# Patient Record
Sex: FEMALE | Race: WHITE | NOT HISPANIC OR LATINO | Employment: UNEMPLOYED | URBAN - METROPOLITAN AREA
[De-identification: names, ages, dates, MRNs, and addresses within clinical notes are randomized per-mention and may not be internally consistent; named-entity substitution may affect disease eponyms.]

---

## 2018-08-31 ENCOUNTER — OFFICE VISIT CONVERTED (OUTPATIENT)
Dept: NEUROSURGERY | Facility: CLINIC | Age: 53
End: 2018-08-31
Attending: PHYSICIAN ASSISTANT

## 2018-08-31 ENCOUNTER — CONVERSION ENCOUNTER (OUTPATIENT)
Dept: NEUROLOGY | Facility: CLINIC | Age: 53
End: 2018-08-31

## 2019-01-15 ENCOUNTER — HOSPITAL ENCOUNTER (OUTPATIENT)
Dept: URGENT CARE | Facility: CLINIC | Age: 54
Discharge: HOME | End: 2019-01-15
Attending: FAMILY MEDICINE

## 2019-01-17 LAB — BACTERIA SPEC AEROBE CULT: NORMAL

## 2019-04-06 ENCOUNTER — HOSPITAL ENCOUNTER (OUTPATIENT)
Dept: URGENT CARE | Facility: CLINIC | Age: 54
Discharge: HOME | End: 2019-04-06

## 2019-04-18 ENCOUNTER — OFFICE VISIT CONVERTED (OUTPATIENT)
Dept: OTOLARYNGOLOGY | Facility: CLINIC | Age: 54
End: 2019-04-18
Attending: OTOLARYNGOLOGY

## 2019-06-21 ENCOUNTER — OFFICE VISIT CONVERTED (OUTPATIENT)
Dept: NEUROSURGERY | Facility: CLINIC | Age: 54
End: 2019-06-21
Attending: PHYSICIAN ASSISTANT

## 2019-07-01 ENCOUNTER — HOSPITAL ENCOUNTER (OUTPATIENT)
Dept: GENERAL RADIOLOGY | Facility: HOSPITAL | Age: 54
Discharge: HOME | End: 2019-07-01
Attending: PHYSICIAN ASSISTANT

## 2019-08-08 ENCOUNTER — OFFICE VISIT CONVERTED (OUTPATIENT)
Dept: NEUROSURGERY | Facility: CLINIC | Age: 54
End: 2019-08-08
Attending: PHYSICIAN ASSISTANT

## 2019-08-08 ENCOUNTER — CONVERSION ENCOUNTER (OUTPATIENT)
Dept: NEUROLOGY | Facility: CLINIC | Age: 54
End: 2019-08-08

## 2019-11-26 ENCOUNTER — OFFICE VISIT CONVERTED (OUTPATIENT)
Dept: SURGERY | Facility: CLINIC | Age: 54
End: 2019-11-26
Attending: SURGERY

## 2020-01-16 ENCOUNTER — HOSPITAL ENCOUNTER (OUTPATIENT)
Dept: SLEEP MEDICINE | Facility: HOSPITAL | Age: 55
Discharge: HOME | End: 2020-01-16
Attending: PSYCHIATRY & NEUROLOGY

## 2020-01-22 ENCOUNTER — HOSPITAL ENCOUNTER (OUTPATIENT)
Dept: GENERAL RADIOLOGY | Facility: HOSPITAL | Age: 55
Discharge: HOME | End: 2020-01-22
Attending: NURSE PRACTITIONER

## 2020-03-17 ENCOUNTER — HOSPITAL ENCOUNTER (OUTPATIENT)
Dept: URGENT CARE | Facility: CLINIC | Age: 55
Discharge: HOME | End: 2020-03-17
Attending: EMERGENCY MEDICINE

## 2020-03-19 LAB — BACTERIA SPEC AEROBE CULT: NORMAL

## 2020-03-26 ENCOUNTER — HOSPITAL ENCOUNTER (OUTPATIENT)
Dept: URGENT CARE | Facility: CLINIC | Age: 55
Discharge: HOME | End: 2020-03-26

## 2020-03-28 LAB — BACTERIA SPEC AEROBE CULT: NORMAL

## 2020-09-28 ENCOUNTER — HOSPITAL ENCOUNTER (OUTPATIENT)
Dept: URGENT CARE | Facility: CLINIC | Age: 55
Discharge: HOME | End: 2020-09-28
Attending: FAMILY MEDICINE

## 2020-10-22 ENCOUNTER — HOSPITAL ENCOUNTER (OUTPATIENT)
Dept: GENERAL RADIOLOGY | Facility: HOSPITAL | Age: 55
Discharge: HOME | End: 2020-10-22
Attending: NURSE PRACTITIONER

## 2020-11-03 ENCOUNTER — OFFICE VISIT CONVERTED (OUTPATIENT)
Dept: ORTHOPEDIC SURGERY | Facility: CLINIC | Age: 55
End: 2020-11-03
Attending: ORTHOPAEDIC SURGERY

## 2020-12-20 ENCOUNTER — HOSPITAL ENCOUNTER (OUTPATIENT)
Dept: URGENT CARE | Facility: CLINIC | Age: 55
Discharge: HOME | End: 2020-12-20
Attending: PHYSICIAN ASSISTANT

## 2020-12-22 LAB — BACTERIA SPEC AEROBE CULT: NORMAL

## 2021-01-17 ENCOUNTER — HOSPITAL ENCOUNTER (OUTPATIENT)
Dept: URGENT CARE | Facility: CLINIC | Age: 56
Discharge: HOME | End: 2021-01-17
Attending: NURSE PRACTITIONER

## 2021-01-19 LAB — BACTERIA SPEC AEROBE CULT: NORMAL

## 2021-01-20 LAB — SARS-COV-2 RNA SPEC QL NAA+PROBE: NOT DETECTED

## 2021-02-03 ENCOUNTER — HOSPITAL ENCOUNTER (OUTPATIENT)
Dept: VACCINE CLINIC | Facility: HOSPITAL | Age: 56
Discharge: HOME | End: 2021-02-03
Attending: INTERNAL MEDICINE

## 2021-03-04 ENCOUNTER — HOSPITAL ENCOUNTER (OUTPATIENT)
Dept: VACCINE CLINIC | Facility: HOSPITAL | Age: 56
Discharge: HOME | End: 2021-03-04
Attending: INTERNAL MEDICINE

## 2021-04-20 ENCOUNTER — HOSPITAL ENCOUNTER (OUTPATIENT)
Dept: GENERAL RADIOLOGY | Facility: HOSPITAL | Age: 56
Discharge: HOME | End: 2021-04-20
Attending: NURSE PRACTITIONER

## 2021-05-05 ENCOUNTER — HOSPITAL ENCOUNTER (OUTPATIENT)
Dept: GENERAL RADIOLOGY | Facility: HOSPITAL | Age: 56
Discharge: HOME | End: 2021-05-05
Attending: NURSE PRACTITIONER

## 2021-05-10 NOTE — H&P
History and Physical      Patient Name: Shawanda Smith   Patient ID: [id number]   Sex: Female   YOB: 1965    Primary Care Provider: Haydee Jolly NP   Referring Provider: Viri GARCIA    Visit Date: November 3, 2020    Provider: Nay Donaldson MD   Location: [facility]   Location Address: [address] [place] [address]  [id number]   Location Phone: [phone]          Chief Complaint  · Right Shoulder Pain      History Of Present Illness  Shawanda Smith is a 54 year old /White female who presents today to [facility].      Patient presents today with a chief complaint of right shoulder pain. Patient states spring of 2019, she went to the back patio to get a doll while wearing flipflops. She slid down and fell down a hill causing her to land on her right shoulder. Since then, one day, patient rolled over in bed and felt a large pop in her shoulder. She has attended PT twice for her shoulder, they discontinued the second round of PT after a month because she showed no improvement. Patient states she is starting to have problems with ROM due to the pain. Due to pain patients PCP got x-rays and an MRI done and referred her to [facility]. She has been icing and putting heat on her shoulder off and on.       Past Medical History  Arm weakness; Arthritis; Cervicalgia; Degenerative Disc Disease ; Difficulty in swallowing; Dysphagia; Headache; Herniated Disc; Migraines; Nausea; Numbness and tingling in both hands; Seasonal allergies; Vertigo         Past Surgical History  Cervical Fusion; Gallbladder; Hysterectomy; Joint Surgery; Lumbar puncture; Metal implants         Medication List  ibuprofen 800 mg oral tablet; lidocaine 5 % topical adhesive patch,medicated; tizanidine 4 mg oral tablet         Allergy List  aspirin; morphine; PENICILLINS       Allergies Reconciled  Family Medical History  Heart Disease; Cancer, Unspecified; Family history of Arthritis;  "Family history of heart disease         Social History  Alcohol (Current some day); Alcohol Use (Current some day); lives with spouse; ; .; Recreational Drug Use (Never); Tobacco (Never); Unemployed.; Working         Review of Systems  · Constitutional  o Denies  o : fever, chills, weight loss  · Cardiovascular  o Denies  o : chest pain, shortness of breath  · Gastrointestinal  o Denies  o : liver disease, heartburn, nausea, blood in stools  · Genitourinary  o Denies  o : painful urination, blood in urine  · Integument  o Denies  o : rash, itching  · Neurologic  o Denies  o : headache, weakness, loss of consciousness  · Musculoskeletal  o Denies  o : painful, swollen joints  · Psychiatric  o Denies  o : drug/alcohol addiction, anxiety, depression      Vitals  Date Time BP Position Site L\R Cuff Size HR RR TEMP (F) WT  HT  BMI kg/m2 BSA m2 O2 Sat FR L/min FiO2 HC       11/03/2020 01:39 PM         193lbs 0oz 5'  3\" 34.19 1.97             Physical Examination  · Constitutional  o Appearance  o : well developed, well-nourished, no obvious deformities present  · Head and Face  o Head  o :   § Inspection  § : normocephalic  o Face  o :   § Inspection  § : no facial lesions  · Eyes  o Conjunctivae  o : conjunctivae normal  o Sclerae  o : sclerae white  · Ears, Nose, Mouth and Throat  o Ears  o :   § External Ears  § : appearance within normal limits  § Hearing  § : intact  o Nose  o :   § External Nose  § : appearance normal  · Neck  o Inspection/Palpation  o : normal appearance  o Range of Motion  o : full range of motion  · Respiratory  o Respiratory Effort  o : breathing unlabored  o Inspection of Chest  o : normal appearance  o Auscultation of Lungs  o : no audible wheezing or rales  · Cardiovascular  o Heart  o : regular rate  · Gastrointestinal  o Abdominal Examination  o : soft and non-tender  · Skin and Subcutaneous Tissue  o General Inspection  o : intact, no rashes  · Psychiatric  o General  o : " Alert and oriented x3  o Judgement and Insight  o : judgment and insight intact  o Mood and Affect  o : mood normal, affect appropriate  · Right Shoulder  o Inspection  o : Sensation grossly intact. Neurovascular intact. Pulses are pleasant. No swelling, skin discoloration or atrophy. Good tone of deltoid, biceps, triceps, wrist extensors, and wrist flexors. Pain with empty can testing. Pain with drop arm testing. Forward flexion to 110. Tender to palpation. Skin intact. Pain with cross body adduction. Mild pain with Neers. Mild pain with Gracia. Popping with ROM.   · Injection Note/Aspiration Note  o Site  o : right shoulder  o Procedure  o : Procedure: After educating the patient, patient gave consent for procedure. After using Chloraprep, the joint space was injected. The patient tolerated the procedure well.   o Medication  o : 80 mg of DepoMedrol with 9cc of 1% Lidocaine  · Imaging  o Imaging  o : [XRAY 9/28/20] 1. Negative radiographs of the shoulder. [MRI 10/22/20] 1. Rotator cuff tendinosis with mild partial articular sided tearing of the supraspinatus, infraspinatus and subscapularis tendons. No evidence of complete tear. 2. Mild acromioclavicular and glenohumeral osteoarthritis. 3. Mild pan labral degenerative tearing with no evidence of a focal displaced tear..           Assessment  · Right shoulder pain, unspecified chronicity     719.41/M25.511  · Shoulder impingement syndrome, right     726.2/M75.41      Plan  · Orders  o Depo-Medrol injection 80mg () - - 11/03/2020   Lot [id number] Exp 09 2021 [employer] Administered by LILIA Donaldson MD  o Shoulder Intra-articular Injection without US Guidance [facility] (08099) - - 11/03/2020   Lot [id number] Exp 08 01 2021 [employer] Administered by LILIA Donaldson MD  · Medications  o Medications have been Reconciled  o Transition of Care or Provider Policy  · Instructions  o Dr. Donaldson saw and examined the patient and agrees with plan.   o X-rays reviewed by   Mendoza.  o Reviewed the patient's Past Medical, Social, and Family history as well as the ROS at today's visit, no changes.  o Call or return if worsening symptoms.  o Follow up in 1 month.  o This note was transcribed by Olive Gomez. minor steen Discussed diagnosis and treatment options with the patient. Patient opted for an injection and tolerated it well. Patient also opted to go back to PT.            Electronically Signed by: Olive Gomez-, Other -Author on November 5, 2020 02:02:00 PM  Electronically Co-signed by: Nay Donaldson MD -Reviewer on November 6, 2020 08:22:34 AM

## 2021-05-14 VITALS — HEIGHT: 63 IN | BODY MASS INDEX: 34.2 KG/M2 | WEIGHT: 193 LBS

## 2021-05-15 VITALS
RESPIRATION RATE: 18 BRPM | HEIGHT: 63 IN | TEMPERATURE: 97.5 F | WEIGHT: 194.12 LBS | HEART RATE: 87 BPM | BODY MASS INDEX: 34.39 KG/M2 | SYSTOLIC BLOOD PRESSURE: 107 MMHG | OXYGEN SATURATION: 98 % | DIASTOLIC BLOOD PRESSURE: 60 MMHG

## 2021-05-15 VITALS
HEIGHT: 63 IN | WEIGHT: 196.06 LBS | BODY MASS INDEX: 34.74 KG/M2 | DIASTOLIC BLOOD PRESSURE: 99 MMHG | SYSTOLIC BLOOD PRESSURE: 150 MMHG

## 2021-05-15 VITALS — BODY MASS INDEX: 34.2 KG/M2 | WEIGHT: 193 LBS | RESPIRATION RATE: 22 BRPM | HEIGHT: 63 IN

## 2021-05-16 VITALS
SYSTOLIC BLOOD PRESSURE: 118 MMHG | BODY MASS INDEX: 34.55 KG/M2 | HEIGHT: 63 IN | HEART RATE: 73 BPM | WEIGHT: 195 LBS | DIASTOLIC BLOOD PRESSURE: 71 MMHG

## 2021-05-22 ENCOUNTER — TRANSCRIBE ORDERS (OUTPATIENT)
Dept: ADMINISTRATIVE | Facility: HOSPITAL | Age: 56
End: 2021-05-22

## 2021-05-22 DIAGNOSIS — D73.89 LESION OF SPLEEN: ICD-10-CM

## 2021-05-22 DIAGNOSIS — Z12.31 ENCOUNTER FOR SCREENING MAMMOGRAM FOR BREAST CANCER: Primary | ICD-10-CM

## 2021-08-07 ENCOUNTER — APPOINTMENT (OUTPATIENT)
Dept: CT IMAGING | Facility: HOSPITAL | Age: 56
End: 2021-08-07

## 2021-08-07 ENCOUNTER — HOSPITAL ENCOUNTER (EMERGENCY)
Facility: HOSPITAL | Age: 56
Discharge: HOME | End: 2021-08-07
Attending: EMERGENCY MEDICINE | Admitting: EMERGENCY MEDICINE

## 2021-08-07 VITALS
RESPIRATION RATE: 16 BRPM | TEMPERATURE: 98 F | SYSTOLIC BLOOD PRESSURE: 100 MMHG | HEART RATE: 55 BPM | BODY MASS INDEX: 37.58 KG/M2 | OXYGEN SATURATION: 100 % | WEIGHT: 212.08 LBS | HEIGHT: 63 IN | DIASTOLIC BLOOD PRESSURE: 68 MMHG

## 2021-08-07 DIAGNOSIS — R51.9 HEADACHE, UNSPECIFIED HEADACHE TYPE: Primary | ICD-10-CM

## 2021-08-07 LAB
ALBUMIN SERPL-MCNC: 3.9 G/DL (ref 3.5–5.2)
ALBUMIN/GLOB SERPL: 1.5 G/DL
ALP SERPL-CCNC: 68 U/L (ref 39–117)
ALT SERPL W P-5'-P-CCNC: 19 U/L (ref 1–33)
ANION GAP SERPL CALCULATED.3IONS-SCNC: 7.1 MMOL/L (ref 5–15)
AST SERPL-CCNC: 18 U/L (ref 1–32)
BASOPHILS # BLD AUTO: 0.05 10*3/MM3 (ref 0–0.2)
BASOPHILS NFR BLD AUTO: 0.7 % (ref 0–1.5)
BILIRUB SERPL-MCNC: 0.3 MG/DL (ref 0–1.2)
BUN SERPL-MCNC: 12 MG/DL (ref 6–20)
BUN/CREAT SERPL: 12.9 (ref 7–25)
CALCIUM SPEC-SCNC: 9.2 MG/DL (ref 8.6–10.5)
CHLORIDE SERPL-SCNC: 102 MMOL/L (ref 98–107)
CO2 SERPL-SCNC: 28.9 MMOL/L (ref 22–29)
CREAT SERPL-MCNC: 0.93 MG/DL (ref 0.57–1)
DEPRECATED RDW RBC AUTO: 43.6 FL (ref 37–54)
EOSINOPHIL # BLD AUTO: 0.27 10*3/MM3 (ref 0–0.4)
EOSINOPHIL NFR BLD AUTO: 3.7 % (ref 0.3–6.2)
ERYTHROCYTE [DISTWIDTH] IN BLOOD BY AUTOMATED COUNT: 13 % (ref 12.3–15.4)
GFR SERPL CREATININE-BSD FRML MDRD: 63 ML/MIN/1.73
GLOBULIN UR ELPH-MCNC: 2.6 GM/DL
GLUCOSE SERPL-MCNC: 94 MG/DL (ref 65–99)
HCT VFR BLD AUTO: 45.7 % (ref 34–46.6)
HGB BLD-MCNC: 14.8 G/DL (ref 12–15.9)
HOLD SPECIMEN: NORMAL
HOLD SPECIMEN: NORMAL
IMM GRANULOCYTES # BLD AUTO: 0.03 10*3/MM3 (ref 0–0.05)
IMM GRANULOCYTES NFR BLD AUTO: 0.4 % (ref 0–0.5)
LYMPHOCYTES # BLD AUTO: 3.39 10*3/MM3 (ref 0.7–3.1)
LYMPHOCYTES NFR BLD AUTO: 47 % (ref 19.6–45.3)
MCH RBC QN AUTO: 29.5 PG (ref 26.6–33)
MCHC RBC AUTO-ENTMCNC: 32.4 G/DL (ref 31.5–35.7)
MCV RBC AUTO: 91.2 FL (ref 79–97)
MONOCYTES # BLD AUTO: 0.47 10*3/MM3 (ref 0.1–0.9)
MONOCYTES NFR BLD AUTO: 6.5 % (ref 5–12)
NEUTROPHILS NFR BLD AUTO: 3.01 10*3/MM3 (ref 1.7–7)
NEUTROPHILS NFR BLD AUTO: 41.7 % (ref 42.7–76)
NRBC BLD AUTO-RTO: 0 /100 WBC (ref 0–0.2)
PLATELET # BLD AUTO: 231 10*3/MM3 (ref 140–450)
PMV BLD AUTO: 11.4 FL (ref 6–12)
POTASSIUM SERPL-SCNC: 4.6 MMOL/L (ref 3.5–5.2)
PROT SERPL-MCNC: 6.5 G/DL (ref 6–8.5)
RBC # BLD AUTO: 5.01 10*6/MM3 (ref 3.77–5.28)
SODIUM SERPL-SCNC: 138 MMOL/L (ref 136–145)
WBC # BLD AUTO: 7.22 10*3/MM3 (ref 3.4–10.8)
WHOLE BLOOD HOLD SPECIMEN: NORMAL

## 2021-08-07 PROCEDURE — 25010000002 METOCLOPRAMIDE PER 10 MG: Performed by: NURSE PRACTITIONER

## 2021-08-07 PROCEDURE — 25010000002 DIPHENHYDRAMINE PER 50 MG: Performed by: NURSE PRACTITIONER

## 2021-08-07 PROCEDURE — 80053 COMPREHEN METABOLIC PANEL: CPT

## 2021-08-07 PROCEDURE — 99284 EMERGENCY DEPT VISIT MOD MDM: CPT

## 2021-08-07 PROCEDURE — 70450 CT HEAD/BRAIN W/O DYE: CPT

## 2021-08-07 PROCEDURE — 96375 TX/PRO/DX INJ NEW DRUG ADDON: CPT

## 2021-08-07 PROCEDURE — 85025 COMPLETE CBC W/AUTO DIFF WBC: CPT

## 2021-08-07 PROCEDURE — 70450 CT HEAD/BRAIN W/O DYE: CPT | Performed by: RADIOLOGY

## 2021-08-07 PROCEDURE — 96374 THER/PROPH/DIAG INJ IV PUSH: CPT

## 2021-08-07 RX ORDER — SODIUM CHLORIDE 0.9 % (FLUSH) 0.9 %
10 SYRINGE (ML) INJECTION AS NEEDED
Status: DISCONTINUED | OUTPATIENT
Start: 2021-08-07 | End: 2021-08-07 | Stop reason: HOSPADM

## 2021-08-07 RX ORDER — METOCLOPRAMIDE HYDROCHLORIDE 5 MG/ML
10 INJECTION INTRAMUSCULAR; INTRAVENOUS ONCE
Status: COMPLETED | OUTPATIENT
Start: 2021-08-07 | End: 2021-08-07

## 2021-08-07 RX ORDER — ACETAMINOPHEN 500 MG
1000 TABLET ORAL ONCE
Status: COMPLETED | OUTPATIENT
Start: 2021-08-07 | End: 2021-08-07

## 2021-08-07 RX ORDER — DIPHENHYDRAMINE HYDROCHLORIDE 50 MG/ML
12.5 INJECTION INTRAMUSCULAR; INTRAVENOUS ONCE
Status: COMPLETED | OUTPATIENT
Start: 2021-08-07 | End: 2021-08-07

## 2021-08-07 RX ADMIN — ACETAMINOPHEN 1000 MG: 500 TABLET ORAL at 15:50

## 2021-08-07 RX ADMIN — METOCLOPRAMIDE HYDROCHLORIDE 10 MG: 5 INJECTION INTRAMUSCULAR; INTRAVENOUS at 15:50

## 2021-08-07 RX ADMIN — SODIUM CHLORIDE 1000 ML: 9 INJECTION, SOLUTION INTRAVENOUS at 15:50

## 2021-08-07 RX ADMIN — DIPHENHYDRAMINE HYDROCHLORIDE 12.5 MG: 50 INJECTION, SOLUTION INTRAMUSCULAR; INTRAVENOUS at 15:50

## 2021-08-07 NOTE — DISCHARGE INSTRUCTIONS
Drink plenty of fluids. Return to the ER for development of slurred speech, facial droop, weakness on one side of the body, seizure or any concerns.

## 2021-08-07 NOTE — ED PROVIDER NOTES
Subjective   Pt reports she has nausea for 2 days, throat felt tight, and fatigue. Seen yesterday at urgent care, tested for Flu and COVID which was negative, discharged. Yesterday she began to have vision issues described as blurry and began to have pressure in her head.       History provided by:  Patient  Headache  Pain location:  Generalized  Quality:  Dull  Radiates to:  Does not radiate  Onset quality:  Gradual  Duration:  1 day  Timing:  Constant  Progression:  Unchanged  Chronicity:  New  Relieved by:  Nothing  Worsened by:  Nothing  Associated symptoms: blurred vision, fatigue and nausea    Associated symptoms: no abdominal pain, no congestion, no cough, no diarrhea, no dizziness, no drainage, no ear pain, no eye pain, no facial pain, no fever, no focal weakness, no hearing loss, no loss of balance, no neck stiffness, no numbness, no paresthesias, no seizures, no sore throat, no syncope, no vomiting and no weakness        Review of Systems   Constitutional: Positive for fatigue. Negative for chills and fever.   HENT: Negative for congestion, ear pain, hearing loss, postnasal drip and sore throat.    Eyes: Positive for blurred vision. Negative for pain.   Respiratory: Negative for cough, chest tightness and shortness of breath.    Cardiovascular: Negative for chest pain and syncope.   Gastrointestinal: Positive for nausea. Negative for abdominal pain, diarrhea and vomiting.   Genitourinary: Negative for flank pain and hematuria.   Musculoskeletal: Negative for joint swelling and neck stiffness.   Skin: Negative for pallor.   Neurological: Positive for headaches. Negative for dizziness, tremors, focal weakness, seizures, syncope, facial asymmetry, speech difficulty, weakness, light-headedness, numbness, paresthesias and loss of balance.   All other systems reviewed and are negative.      Past Medical History:   Diagnosis Date   • Arthritis        Allergies   Allergen Reactions   • Morphine Itching and  Palpitations     Chest tightness     • Penicillins Rash     Rash     • Aspirin Rash       Past Surgical History:   Procedure Laterality Date   • CHOLECYSTECTOMY     • HYSTERECTOMY     • NECK SURGERY         Family History   Problem Relation Age of Onset   • Hypertension Mother    • Heart disease Father        Social History     Socioeconomic History   • Marital status:      Spouse name: Not on file   • Number of children: Not on file   • Years of education: Not on file   • Highest education level: Not on file   Tobacco Use   • Smoking status: Never Smoker   • Smokeless tobacco: Never Used   Vaping Use   • Vaping Use: Never used   Substance and Sexual Activity   • Alcohol use: Never   • Drug use: Never   • Sexual activity: Defer           Objective   Physical Exam  Vitals and nursing note reviewed.   Constitutional:       General: She is not in acute distress.     Appearance: Normal appearance. She is not toxic-appearing.   HENT:      Head: Normocephalic and atraumatic.      Mouth/Throat:      Mouth: Mucous membranes are moist.   Eyes:      Extraocular Movements: Extraocular movements intact.      Pupils: Pupils are equal, round, and reactive to light.   Cardiovascular:      Rate and Rhythm: Normal rate and regular rhythm.      Pulses: Normal pulses.      Heart sounds: Normal heart sounds.   Pulmonary:      Effort: Pulmonary effort is normal. No respiratory distress.      Breath sounds: Normal breath sounds.   Abdominal:      General: Abdomen is flat.      Palpations: Abdomen is soft.      Tenderness: There is no abdominal tenderness.   Musculoskeletal:         General: Normal range of motion.      Cervical back: Normal range of motion and neck supple.   Skin:     General: Skin is warm and dry.      Capillary Refill: Capillary refill takes less than 2 seconds.   Neurological:      Mental Status: She is alert and oriented to person, place, and time. Mental status is at baseline.      GCS: GCS eye subscore is 4.  GCS verbal subscore is 5. GCS motor subscore is 6.      Cranial Nerves: Cranial nerves are intact.      Sensory: No sensory deficit.      Motor: No weakness.      Coordination: Coordination is intact.      Gait: Gait is intact.         Procedures           ED Course  ED Course as of Aug 08 0005   Sat Aug 07, 2021   1604 Pt feels better after medications, approx 800cc of IVF remain to infuse. Will let fluid bolus complete and discharge.     [CM]      ED Course User Index  [CM] Enrico Dent, MARILU                                           MDM  Number of Diagnoses or Management Options  Headache, unspecified headache type: new and requires workup  Diagnosis management comments: Pt reports improved headache after medications. Discussed CT results along with return to ER precautions. Stable for discharge.        Amount and/or Complexity of Data Reviewed  Clinical lab tests: reviewed and ordered  Tests in the radiology section of CPT®: reviewed and ordered    Risk of Complications, Morbidity, and/or Mortality  Presenting problems: low  Diagnostic procedures: low  Management options: minimal    Patient Progress  Patient progress: improved      Final diagnoses:   Headache, unspecified headache type       ED Disposition  ED Disposition     ED Disposition Condition Comment    Discharge Stable           Haydee Jolly, MARILU  [address] [id number]  [phone]    On 8/9/2021  As needed         Medication List      No changes were made to your prescriptions during this visit.          Enrico Dent APRN  08/08/21 0005

## 2021-08-07 NOTE — ED NOTES
Also c/o facial numbness and neck spasms.  Seen at urgent care yesterday, had negative strep/flu/and covid.      Genesis Sheehan RN  08/07/21 2241

## 2021-08-14 PROBLEM — R11.0 NAUSEA: Status: ACTIVE | Noted: 2021-08-14

## 2021-08-14 PROBLEM — Z87.39 HISTORY OF DEGENERATIVE DISC DISEASE: Status: ACTIVE | Noted: 2021-08-14

## 2021-08-14 PROBLEM — M54.2 CERVICALGIA: Status: ACTIVE | Noted: 2021-08-14

## 2021-08-14 PROBLEM — R42 VERTIGO: Status: ACTIVE | Noted: 2021-08-14

## 2021-08-14 PROBLEM — R13.10 DYSPHAGIA: Status: ACTIVE | Noted: 2021-08-14

## 2021-08-14 PROBLEM — Z87.39 HISTORY OF HERNIATED INTERVERTEBRAL DISC: Status: ACTIVE | Noted: 2021-08-14

## 2021-08-14 PROBLEM — R29.898 ARM WEAKNESS: Status: ACTIVE | Noted: 2021-08-14

## 2021-08-14 PROBLEM — R51.9 HEADACHE: Status: ACTIVE | Noted: 2021-08-14

## 2021-08-14 PROBLEM — R13.10 DIFFICULTY IN SWALLOWING: Status: ACTIVE | Noted: 2021-08-14

## 2021-08-14 PROBLEM — R20.2 NUMBNESS AND TINGLING IN BOTH HANDS: Status: ACTIVE | Noted: 2021-08-14

## 2021-08-14 PROBLEM — R20.0 NUMBNESS AND TINGLING IN BOTH HANDS: Status: ACTIVE | Noted: 2021-08-14

## 2021-08-23 ENCOUNTER — HOSPITAL ENCOUNTER (OUTPATIENT)
Dept: MAMMOGRAPHY | Facility: HOSPITAL | Age: 56
Discharge: HOME | End: 2021-08-23
Admitting: NURSE PRACTITIONER

## 2021-08-23 DIAGNOSIS — Z12.31 ENCOUNTER FOR SCREENING MAMMOGRAM FOR BREAST CANCER: ICD-10-CM

## 2021-08-23 PROCEDURE — 77063 BREAST TOMOSYNTHESIS BI: CPT

## 2021-08-23 PROCEDURE — 77067 SCR MAMMO BI INCL CAD: CPT | Performed by: RADIOLOGY

## 2021-08-23 PROCEDURE — 77063 BREAST TOMOSYNTHESIS BI: CPT | Performed by: RADIOLOGY

## 2021-08-23 PROCEDURE — 77067 SCR MAMMO BI INCL CAD: CPT

## 2021-09-21 ENCOUNTER — HOSPITAL ENCOUNTER (OUTPATIENT)
Dept: ULTRASOUND IMAGING | Facility: HOSPITAL | Age: 56
Discharge: HOME | End: 2021-09-21
Admitting: NURSE PRACTITIONER

## 2021-09-21 DIAGNOSIS — D73.89 LESION OF SPLEEN: ICD-10-CM

## 2021-09-21 PROCEDURE — 76705 ECHO EXAM OF ABDOMEN: CPT

## 2021-10-07 ENCOUNTER — OFFICE VISIT (OUTPATIENT)
Dept: FAMILY MEDICINE CLINIC | Facility: CLINIC | Age: 56
End: 2021-10-07

## 2021-10-07 VITALS
OXYGEN SATURATION: 96 % | SYSTOLIC BLOOD PRESSURE: 126 MMHG | TEMPERATURE: 98.1 F | HEART RATE: 74 BPM | BODY MASS INDEX: 38.62 KG/M2 | DIASTOLIC BLOOD PRESSURE: 80 MMHG | HEIGHT: 63 IN | WEIGHT: 218 LBS

## 2021-10-07 DIAGNOSIS — F41.9 ANXIETY: Primary | ICD-10-CM

## 2021-10-07 DIAGNOSIS — Z87.39 HISTORY OF HERNIATED INTERVERTEBRAL DISC: ICD-10-CM

## 2021-10-07 DIAGNOSIS — Z87.39 HISTORY OF DEGENERATIVE DISC DISEASE: ICD-10-CM

## 2021-10-07 DIAGNOSIS — M54.2 CERVICALGIA: ICD-10-CM

## 2021-10-07 PROCEDURE — 99214 OFFICE O/P EST MOD 30 MIN: CPT | Performed by: FAMILY MEDICINE

## 2021-10-07 RX ORDER — FLUOXETINE HYDROCHLORIDE 20 MG/1
20 CAPSULE ORAL DAILY
Qty: 90 CAPSULE | Refills: 0 | Status: SHIPPED | OUTPATIENT
Start: 2021-10-07 | End: 2022-01-03

## 2021-10-07 NOTE — PROGRESS NOTES
Chief Complaint  Chief Complaint   Patient presents with   • Establish Care   • Osteoarthritis     spine   • Back Pain   • Headache       HPI:  Shawanda Smith presents to [facility]     Pt is from [facility] and had a skiing accident in high school and had MRIs done and saw Dr. Sanchez in 2018 and had injections for the last 2 years  And had 2 ablations on the left and now a third on the right.   Pt has had 4 rounds of physical therapy in the last 2 years.  Pt is hurting all the time and she is having anxiety about the pain. Pt has been seeing Haydee Gillette for her conditions and she just started to get cymbalta.     July 2021 last pap and was normal. Pt has had 2 kids. Grandmother and Aunt had breast cancer so I am advising her to have her mammogram every year.      Medical History:  Past History:  Medical History: has a past medical history of Anxiety, Arm weakness, Arthritis, Cervicalgia, Difficulty in swallowing, Dysphagia, Headache, History of herniated intervertebral disc, degenerative disc disease, Low back pain, Migraines, Nausea, Numbness and tingling in both hands, Seasonal allergies, Vertigo, and Visual impairment.   Surgical History: has a past surgical history that includes Neck surgery; Cholecystectomy; Hysterectomy; Cervical fusion (2002); Gallbladder surgery; Joint replacement; Lumbar puncture; Other surgical history; and Endometrial ablation.   Family History: family history includes Arthritis in her father; Cancer in her maternal grandmother and another family member; Heart disease in her father and paternal grandfather; Hypertension in her mother.   Social History: reports that she has never smoked. She has never used smokeless tobacco. She reports that she does not drink alcohol and does not use drugs.  Immunization History   Administered Date(s) Administered   • COVID-19 (MODERNA) 02/03/2021, 03/04/2021   • Flu Vaccine Split Quad 09/22/2020   •  "Shingrix 11/03/2020   • Tdap 09/22/2020         Allergies: Morphine, Penicillins, and Aspirin     Medications:  Current Outpatient Medications on File Prior to Visit   Medication Sig Dispense Refill   • diclofenac (VOLTAREN) 75 MG EC tablet Take 75 mg by mouth 2 (Two) Times a Day.     • estradiol (ESTRACE) 0.5 MG tablet Take 0.5 mg by mouth Daily.     • gabapentin (NEURONTIN) 400 MG capsule Take 400 mg by mouth 3 (Three) Times a Day.     • montelukast (SINGULAIR) 10 MG tablet Take 10 mg by mouth Daily.     • [DISCONTINUED] DULoxetine (CYMBALTA) 20 MG capsule Take 20 mg by mouth Daily.     • lidocaine (LIDODERM) 5 % lidocaine 5 % topical adhesive patch,medicated apply 1 patch by transdermal route once daily (May wear up to 12hours.)   Active       No current facility-administered medications on file prior to visit.        Health Maintenance Due   Topic Date Due   • ANNUAL PHYSICAL  Never done   • ZOSTER VACCINE (2 of 2) 12/29/2020   • INFLUENZA VACCINE  08/01/2021   • HEPATITIS C SCREENING  Never done       Vital Signs:   Vitals:    10/07/21 1018   BP: 126/80   Pulse: 74   Temp: 98.1 °F (36.7 °C)   SpO2: 96%   Weight: 98.9 kg (218 lb)   Height: 160 cm (63\")      Body mass index is 38.62 kg/m².     ROS:  Review of Systems   Constitutional: Negative for fatigue and fever.   HENT: Negative for congestion, ear pain and sinus pressure.    Respiratory: Negative for cough, chest tightness and shortness of breath.    Cardiovascular: Negative for chest pain, palpitations and leg swelling.   Gastrointestinal: Negative for abdominal pain and diarrhea.   Genitourinary: Negative for dysuria and frequency.   Neurological: Negative for speech difficulty, headache and confusion.   Psychiatric/Behavioral: Negative for agitation and behavioral problems.          Physical Exam  Constitutional:       Appearance: Normal appearance.   HENT:      Right Ear: Tympanic membrane normal.      Left Ear: Tympanic membrane normal.      Nose: Nose " normal.   Eyes:      Extraocular Movements: Extraocular movements intact.      Conjunctiva/sclera: Conjunctivae normal.      Pupils: Pupils are equal, round, and reactive to light.   Cardiovascular:      Rate and Rhythm: Normal rate and regular rhythm.   Pulmonary:      Effort: Pulmonary effort is normal.      Breath sounds: Normal breath sounds.   Abdominal:      General: Bowel sounds are normal.   Musculoskeletal:         General: Normal range of motion.      Cervical back: Normal range of motion.   Skin:     General: Skin is warm and dry.   Neurological:      General: No focal deficit present.      Mental Status: She is alert and oriented to person, place, and time.   Psychiatric:         Mood and Affect: Mood normal.         Behavior: Behavior normal.          Result Review   MRI Cervical Spine Without Contrast (04/21/2021 02:54)  MRI Thoracic Spine Without Contrast (04/21/2021 03:32)  MRI Lumbar Spine Without Contrast (04/21/2021 03:45)       Diagnoses and all orders for this visit:    1. Anxiety (Primary)  -     FLUoxetine (PROzac) 20 MG capsule; Take 1 capsule by mouth Daily for 90 days.  Dispense: 90 capsule; Refill: 0    2. History of degenerative disc disease  -     Ambulatory Referral to Neurosurgery    3. History of herniated intervertebral disc  -     Ambulatory Referral to Neurosurgery    4. Cervicalgia  -     Ambulatory Referral to Neurosurgery          Smoking Cessation:    Shawanda Smith  reports that she has never smoked. She has never used smokeless tobacco.        Follow Up   Return in about 4 weeks (around 11/4/2021).  Patient was given instructions and counseling regarding her condition or for health maintenance advice. Please see specific information pulled into the AVS if appropriate.       Chacha Mae MD

## 2021-11-02 ENCOUNTER — OFFICE VISIT (OUTPATIENT)
Dept: NEUROSURGERY | Facility: CLINIC | Age: 56
End: 2021-11-02

## 2021-11-02 VITALS
WEIGHT: 215 LBS | SYSTOLIC BLOOD PRESSURE: 122 MMHG | HEIGHT: 63 IN | HEART RATE: 80 BPM | BODY MASS INDEX: 38.09 KG/M2 | DIASTOLIC BLOOD PRESSURE: 80 MMHG

## 2021-11-02 DIAGNOSIS — R20.0 BILATERAL HAND NUMBNESS: ICD-10-CM

## 2021-11-02 DIAGNOSIS — M54.12 CERVICAL RADICULOPATHY: ICD-10-CM

## 2021-11-02 DIAGNOSIS — M47.812 CERVICAL SPONDYLOSIS WITHOUT MYELOPATHY: Primary | ICD-10-CM

## 2021-11-02 DIAGNOSIS — R20.2 PARESTHESIA: ICD-10-CM

## 2021-11-02 DIAGNOSIS — Z98.1 HX OF FUSION OF CERVICAL SPINE: ICD-10-CM

## 2021-11-02 PROCEDURE — 99214 OFFICE O/P EST MOD 30 MIN: CPT | Performed by: NURSE PRACTITIONER

## 2021-11-02 RX ORDER — MECLIZINE HYDROCHLORIDE 25 MG/1
25 TABLET ORAL 2 TIMES DAILY PRN
COMMUNITY
Start: 2021-08-09

## 2021-11-02 RX ORDER — SUMATRIPTAN 50 MG/1
50 TABLET, FILM COATED ORAL
COMMUNITY
Start: 2021-08-12

## 2021-11-02 NOTE — PROGRESS NOTES
"Chief Complaint  Neck Pain    Subjective          Shawanda Smith who is a 55 y.o. year old female who presents to [facility] for evaluation of her neck pain and upper extremity numbness.     Pt with prior history of ACDF C6/7 18 years ago. Presenting today with concerns of chronic neck pain with intermittent numbness and paresthesias in the bilateral hands. These symptoms started around 4 years ago. She has been followed by pain management, receiving CESB and Cervical RFA for the past two years. These provide temporary relief in her neck pain. Pain is worse on the right side of her neck. Will have muscle tightness and spasm in the right trapezius territory. When this occurs she will feel weakness in the entire right upper extremity. She has been doing exercises at home, riding an elliptical, which will increase the pain symptoms. She will have numbness and paresthesias in the hands with gripping and at night. This has become more frequent and persistent. At her last visit there was discussion regarding a NCV to evaluate for entrapment neuropathy. She was advised to wear wrist splints at bedtime, which she was previously doing and provided benefit. She has not used the wrist splints recently.       MRI Cervical Spine on 4/21/21 at [facility] personally reviewed. S/P ACDF C6-C7. Chronic retrolisthesis at C4 on C5. Multilevel degenerative changes without high grade canal or foraminal narrowing.    Recent Interventions: CESB, Cervical RFA      Review of Systems   Musculoskeletal: Positive for neck pain and neck stiffness.   Neurological: Positive for weakness and numbness.   All other systems reviewed and are negative.       Objective   Vital Signs:   /80   Pulse 80   Ht 160 cm (63\")   Wt 97.5 kg (215 lb)   BMI 38.09 kg/m²       Physical Exam  Vitals reviewed.   Constitutional:       Appearance: Normal appearance.   Musculoskeletal:      Cervical back: Tenderness " present. Pain with movement present. Decreased range of motion.   Neurological:      Mental Status: She is alert and oriented to person, place, and time.      Gait: Gait is intact.      Deep Tendon Reflexes: Strength normal.      Reflex Scores:       Tricep reflexes are 1+ on the right side and 1+ on the left side.       Bicep reflexes are 1+ on the right side and 1+ on the left side.       Brachioradialis reflexes are 1+ on the right side and 1+ on the left side.       Neurologic Exam     Mental Status   Oriented to person, place, and time.   Level of consciousness: alert    Motor Exam   Muscle bulk: normal  Overall muscle tone: normal    Strength   Strength 5/5 throughout.     Sensory Exam   Light touch normal.     Gait, Coordination, and Reflexes     Gait  Gait: normal    Reflexes   Right brachioradialis: 1+  Left brachioradialis: 1+  Right biceps: 1+  Left biceps: 1+  Right triceps: 1+  Left triceps: 1+  Right Huynh: absent  Left Huynh: absent       Result Review :                MRI Cervical Spine on 4/21/21 at [facility] personally reviewed. S/P ACDF C6-C7. Chronic retrolisthesis at C4 on C5. Multilevel degenerative changes without high grade canal or foraminal narrowing.    Assessment and Plan    Diagnoses and all orders for this visit:    1. Cervical spondylosis without myelopathy (Primary)  -     Ambulatory Referral to Physical Therapy Evaluate and treat; Heat; Moist heat; Cross Fiber; Stretching, ROM, Strengthening    2. Hx of fusion of cervical spine  -     Ambulatory Referral to Physical Therapy Evaluate and treat; Heat; Moist heat; Cross Fiber; Stretching, ROM, Strengthening    3. Paresthesia  -     EMG & Nerve Conduction Test; Future    4. Bilateral hand numbness  -     EMG & Nerve Conduction Test; Future    5. Cervical radiculopathy  -     EMG & Nerve Conduction Test; Future    Pt with prior ACDF C6/7 with persistent neck pain with right sided radiculopathy. We reviewed her MRI Cervical Spine. No  surgical recommendations at this time. Will refer to physical therapy for dry needling. She has intermittent hand paresthesias that have progressed. Will order EMG/NCV to evaluate for entrapment neuropathy. Recommend wearing wrist splints at bedtime. She will follow up in 8 weeks.       Follow Up   Return in about 8 weeks (around 12/28/2021).  Patient was given instructions and counseling regarding her condition or for health maintenance advice.     -Physical therapy  -EMG/NCV  -Follow up in 8 weeks

## 2021-11-08 ENCOUNTER — OFFICE VISIT (OUTPATIENT)
Dept: FAMILY MEDICINE CLINIC | Facility: CLINIC | Age: 56
End: 2021-11-08

## 2021-11-08 VITALS
DIASTOLIC BLOOD PRESSURE: 82 MMHG | BODY MASS INDEX: 38.62 KG/M2 | OXYGEN SATURATION: 98 % | HEART RATE: 76 BPM | TEMPERATURE: 97.9 F | SYSTOLIC BLOOD PRESSURE: 128 MMHG | WEIGHT: 218 LBS | HEIGHT: 63 IN

## 2021-11-08 DIAGNOSIS — R20.2 NUMBNESS AND TINGLING IN BOTH HANDS: ICD-10-CM

## 2021-11-08 DIAGNOSIS — Z87.39 HISTORY OF HERNIATED INTERVERTEBRAL DISC: ICD-10-CM

## 2021-11-08 DIAGNOSIS — M54.2 CERVICALGIA: Primary | ICD-10-CM

## 2021-11-08 DIAGNOSIS — E53.8 LOW SERUM VITAMIN B12: ICD-10-CM

## 2021-11-08 DIAGNOSIS — R20.0 NUMBNESS AND TINGLING IN BOTH HANDS: ICD-10-CM

## 2021-11-08 PROCEDURE — 99214 OFFICE O/P EST MOD 30 MIN: CPT | Performed by: FAMILY MEDICINE

## 2021-11-08 NOTE — PROGRESS NOTES
Answers for HPI/ROS submitted by the patient on 11/7/2021  Please describe your symptoms.: Chronic neck pain, anxiety  Have you had these symptoms before?: Yes  How long have you been having these symptoms?: Greater than 2 weeks  Please list any medications you are currently taking for this condition.: Gabapentin, diclofenac, prozac  Please describe any probable cause for these symptoms. : Skiing accident.  Anxiety from chronic pain  What is the primary reason for your visit?: Other    Chief Complaint  Chief Complaint   Patient presents with   • Follow-up   • Med Refill   • Arm Pain     right       HPI:  Shawanda Smith presents to [facility]     Pt saw Neurosurgery on 11/2/2021 and was told that she should continue physical therapy and follow up in 8 weeks. Pt was diagnosed with carpal tunnel and was told to wear wrist splints and get an EMG. Patient reports that she continues to have discomfort in her right arm is very uncomfortable at night and she would like to have something done about it because it is constantly bothering her.  Patient has been through physical therapy and multiple modalities in the past for treatment but would like to get a second opinion.  I will be referring her to [facility] spine to see if there are other options for her.    Vitamin B12 deficiency-patient's vitamin B12 level was right at 401.  I recommended that she take supplemental vitamin B12 daily to help increase her levels.    Patient had a mammogram done July 2021.    Medical History:  Past History:  Medical History: has a past medical history of Anxiety, Arm weakness, Arthritis, Cervicalgia, Difficulty in swallowing, Dysphagia, Headache, History of herniated intervertebral disc, degenerative disc disease, Low back pain, Migraines, Nausea, Numbness and tingling in both hands, Seasonal allergies, Vertigo, and Visual impairment.   Surgical History: has a past surgical history that  includes Neck surgery; Cholecystectomy; Hysterectomy; Cervical fusion (2002); Gallbladder surgery; Joint replacement; Lumbar puncture; Other surgical history; and Endometrial ablation.   Family History: family history includes Arthritis in her father; Cancer in her maternal grandmother and another family member; Heart disease in her father and paternal grandfather; Hypertension in her mother.   Social History: reports that she has never smoked. She has never used smokeless tobacco. She reports that she does not drink alcohol and does not use drugs.  Immunization History   Administered Date(s) Administered   • COVID-19 (MODERNA) 02/03/2021, 03/04/2021   • Flu Vaccine Split Quad 09/22/2020   • Shingrix 11/03/2020   • Tdap 09/22/2020         Allergies: Morphine, Penicillins, and Aspirin     Medications:  Current Outpatient Medications on File Prior to Visit   Medication Sig Dispense Refill   • diclofenac (VOLTAREN) 75 MG EC tablet Take 75 mg by mouth 2 (Two) Times a Day.     • estradiol (ESTRACE) 0.5 MG tablet Take 0.5 mg by mouth Daily.     • FLUoxetine (PROzac) 20 MG capsule Take 1 capsule by mouth Daily for 90 days. 90 capsule 0   • gabapentin (NEURONTIN) 400 MG capsule Take 400 mg by mouth 3 (Three) Times a Day.     • lidocaine (LIDODERM) 5 % lidocaine 5 % topical adhesive patch,medicated apply 1 patch by transdermal route once daily (May wear up to 12hours.)   Active     • meclizine (ANTIVERT) 25 MG tablet Take 25 mg by mouth 2 (Two) Times a Day As Needed.     • montelukast (SINGULAIR) 10 MG tablet Take 10 mg by mouth Daily.     • SUMAtriptan (IMITREX) 50 MG tablet Take 50 mg by mouth.       No current facility-administered medications on file prior to visit.        Health Maintenance Due   Topic Date Due   • ANNUAL PHYSICAL  Never done   • ZOSTER VACCINE (2 of 2) 12/29/2020   • INFLUENZA VACCINE  08/01/2021   • HEPATITIS C SCREENING  Never done       Vital Signs:   Vitals:    11/08/21 0804   BP: 128/82   Pulse:  "76   Temp: 97.9 °F (36.6 °C)   SpO2: 98%   Weight: 98.9 kg (218 lb)   Height: 160 cm (63\")      Body mass index is 38.62 kg/m².     ROS:  Review of Systems   Constitutional: Negative for fatigue and fever.   HENT: Negative for congestion, ear pain and sinus pressure.    Respiratory: Negative for cough, chest tightness and shortness of breath.    Cardiovascular: Negative for chest pain, palpitations and leg swelling.   Gastrointestinal: Negative for abdominal pain and diarrhea.   Genitourinary: Negative for dysuria and frequency.   Neurological: Negative for speech difficulty, headache and confusion.   Psychiatric/Behavioral: Negative for agitation and behavioral problems.          Physical Exam  Constitutional:       Appearance: Normal appearance.   HENT:      Right Ear: Tympanic membrane normal.      Left Ear: Tympanic membrane normal.      Nose: Nose normal.   Eyes:      Extraocular Movements: Extraocular movements intact.      Conjunctiva/sclera: Conjunctivae normal.      Pupils: Pupils are equal, round, and reactive to light.   Cardiovascular:      Rate and Rhythm: Normal rate and regular rhythm.   Pulmonary:      Effort: Pulmonary effort is normal.      Breath sounds: Normal breath sounds.   Abdominal:      General: Bowel sounds are normal.   Musculoskeletal:         General: Normal range of motion.      Cervical back: Normal range of motion.   Skin:     General: Skin is warm and dry.   Neurological:      General: No focal deficit present.      Mental Status: She is alert and oriented to person, place, and time.   Psychiatric:         Mood and Affect: Mood normal.         Behavior: Behavior normal.          Result Review   VITAMIN B12 (08/09/2021 08:05)  Comprehensive Metabolic Panel (08/07/2021 13:09)       Diagnoses and all orders for this visit:    1. Cervicalgia (Primary)  -     Ambulatory Referral to Neurosurgery    2. History of herniated intervertebral disc  -     Ambulatory Referral to " Neurosurgery    3. Numbness and tingling in both hands    4. Low serum vitamin B12          Smoking Cessation:    Shawanda Smith  reports that she has never smoked. She has never used smokeless tobacco.        Follow Up   Return in about 3 months (around 2/8/2022).  Patient was given instructions and counseling regarding her condition or for health maintenance advice. Please see specific information pulled into the AVS if appropriate.       Chacha Mae MD

## 2021-11-19 ENCOUNTER — TELEPHONE (OUTPATIENT)
Dept: FAMILY MEDICINE CLINIC | Facility: CLINIC | Age: 56
End: 2021-11-19

## 2021-12-22 ENCOUNTER — TRANSCRIBE ORDERS (OUTPATIENT)
Dept: ADMINISTRATIVE | Facility: HOSPITAL | Age: 56
End: 2021-12-22

## 2021-12-22 DIAGNOSIS — D73.89 LESION OF SPLEEN: Primary | ICD-10-CM

## 2021-12-31 DIAGNOSIS — F41.9 ANXIETY: ICD-10-CM

## 2022-01-03 RX ORDER — FLUOXETINE HYDROCHLORIDE 20 MG/1
CAPSULE ORAL
Qty: 90 CAPSULE | Refills: 0 | Status: SHIPPED | OUTPATIENT
Start: 2022-01-03 | End: 2022-04-12 | Stop reason: SDUPTHER

## 2022-01-18 ENCOUNTER — APPOINTMENT (OUTPATIENT)
Dept: ULTRASOUND IMAGING | Facility: HOSPITAL | Age: 57
End: 2022-01-18

## 2022-03-22 ENCOUNTER — OFFICE VISIT (OUTPATIENT)
Dept: FAMILY MEDICINE CLINIC | Facility: CLINIC | Age: 57
End: 2022-03-22

## 2022-03-22 VITALS
TEMPERATURE: 98.2 F | HEIGHT: 63 IN | WEIGHT: 218.2 LBS | HEART RATE: 71 BPM | OXYGEN SATURATION: 97 % | DIASTOLIC BLOOD PRESSURE: 78 MMHG | SYSTOLIC BLOOD PRESSURE: 110 MMHG | BODY MASS INDEX: 38.66 KG/M2

## 2022-03-22 DIAGNOSIS — M54.2 CERVICALGIA: ICD-10-CM

## 2022-03-22 DIAGNOSIS — J30.1 SEASONAL ALLERGIC RHINITIS DUE TO POLLEN: Primary | ICD-10-CM

## 2022-03-22 PROCEDURE — 99214 OFFICE O/P EST MOD 30 MIN: CPT | Performed by: FAMILY MEDICINE

## 2022-03-22 RX ORDER — ESTRADIOL 0.5 MG/1
0.5 TABLET ORAL DAILY
Qty: 90 TABLET | Refills: 3 | Status: SHIPPED | OUTPATIENT
Start: 2022-03-22 | End: 2022-06-20

## 2022-03-22 RX ORDER — GABAPENTIN 300 MG/1
CAPSULE ORAL
COMMUNITY
Start: 2022-03-21

## 2022-03-22 RX ORDER — BACLOFEN 20 MG/1
TABLET ORAL
COMMUNITY
Start: 2022-03-20

## 2022-03-22 RX ORDER — MONTELUKAST SODIUM 10 MG/1
10 TABLET ORAL DAILY
Qty: 90 TABLET | Refills: 3 | Status: SHIPPED | OUTPATIENT
Start: 2022-03-22 | End: 2022-06-20

## 2022-03-22 NOTE — PROGRESS NOTES
Chief Complaint  Chief Complaint   Patient presents with   • Med Refill       HPI:  Shawanda Smith presents to [facility]     Pt had neck surgery with Dr. Kelly.  Pt has a neck collar on and slept last night without the collar.  Pt had an Anterior Cervical Vertebrectomy of C4, cage/plate placement C3-C5 by Dr. Kelly and Dr. Pérez on 1/19/22.  Pt was having some swallowing in the beginning but she is getting better and says she is learning to take it slow. Pt reports she is doing self PT since she has had it in the past.  She has follow up in June 7th.  Pt reports she has a $100 memory foam pillow that has a cooling mechanism.     Medical History:  Past History:  Medical History: has a past medical history of Anxiety, Arm weakness, Arthritis, Cervicalgia, Difficulty in swallowing, Dysphagia, Headache, History of herniated intervertebral disc, degenerative disc disease, Hyperlipidemia (2020), Low back pain, Migraines, Nausea, Numbness and tingling in both hands, Seasonal allergies, Vertigo, and Visual impairment.   Surgical History: has a past surgical history that includes Neck surgery; Cholecystectomy; Hysterectomy; Cervical fusion (2002); Gallbladder surgery; Joint replacement; Lumbar puncture; Other surgical history; and Endometrial ablation.   Family History: family history includes Arthritis in her father; COPD in her mother; Cancer in her maternal grandmother and another family member; Heart disease in her father and paternal grandfather; Hypertension in her mother.   Social History: reports that she has never smoked. She has never used smokeless tobacco. She reports that she does not drink alcohol and does not use drugs.  Immunization History   Administered Date(s) Administered   • COVID-19 (MODERNA) 1st, 2nd, 3rd Dose Only 02/03/2021, 03/04/2021   • Flu Vaccine Split Quad 09/22/2020   • Shingrix 11/03/2020   • Tdap 09/22/2020         Allergies: Morphine,  "Penicillins, and Aspirin     Medications:  Current Outpatient Medications on File Prior to Visit   Medication Sig Dispense Refill   • baclofen (LIORESAL) 20 MG tablet      • FLUoxetine (PROzac) 20 MG capsule TAKE 1 CAPSULE BY MOUTH DAILY 90 capsule 0   • gabapentin (NEURONTIN) 300 MG capsule      • lidocaine (LIDODERM) 5 % lidocaine 5 % topical adhesive patch,medicated apply 1 patch by transdermal route once daily (May wear up to 12hours.)   Active     • meclizine (ANTIVERT) 25 MG tablet Take 25 mg by mouth 2 (Two) Times a Day As Needed.     • SUMAtriptan (IMITREX) 50 MG tablet Take 50 mg by mouth.     • diclofenac (VOLTAREN) 75 MG EC tablet Take 75 mg by mouth 2 (Two) Times a Day.       No current facility-administered medications on file prior to visit.        Health Maintenance Due   Topic Date Due   • ANNUAL PHYSICAL  Never done   • ZOSTER VACCINE (2 of 2) 12/29/2020   • COVID-19 Vaccine (3 - Booster for Moderna series) 08/04/2021   • HEPATITIS C SCREENING  Never done       Vital Signs:   Vitals:    03/22/22 1101   BP: 110/78   BP Location: Right arm   Patient Position: Sitting   Cuff Size: Large Adult   Pulse: 71   Temp: 98.2 °F (36.8 °C)   TempSrc: Oral   SpO2: 97%   Weight: 99 kg (218 lb 3.2 oz)   Height: 160 cm (63\")      Body mass index is 38.65 kg/m².     ROS:  Review of Systems   Constitutional: Negative for fatigue and fever.   HENT: Negative for congestion, ear pain and sinus pressure.    Respiratory: Negative for cough, chest tightness and shortness of breath.    Cardiovascular: Negative for chest pain, palpitations and leg swelling.   Gastrointestinal: Negative for abdominal pain and diarrhea.   Genitourinary: Negative for dysuria and frequency.   Neurological: Negative for speech difficulty, headache and confusion.   Psychiatric/Behavioral: Negative for agitation and behavioral problems.          Physical Exam  Constitutional:       Appearance: Normal appearance.   HENT:      Right Ear: Tympanic " membrane normal.      Left Ear: Tympanic membrane normal.      Nose: Nose normal.   Eyes:      Extraocular Movements: Extraocular movements intact.      Conjunctiva/sclera: Conjunctivae normal.      Pupils: Pupils are equal, round, and reactive to light.   Cardiovascular:      Rate and Rhythm: Normal rate and regular rhythm.   Pulmonary:      Effort: Pulmonary effort is normal.      Breath sounds: Normal breath sounds.   Abdominal:      General: Bowel sounds are normal.   Musculoskeletal:         General: Normal range of motion.      Cervical back: Normal range of motion.   Skin:     General: Skin is warm and dry.   Neurological:      General: No focal deficit present.      Mental Status: She is alert and oriented to person, place, and time.   Psychiatric:         Mood and Affect: Mood normal.         Behavior: Behavior normal.          Result Review   COMPREHENSIVE METABOLIC PANEL (01/04/2022 14:29)  CBC AND DIFFERENTIAL (01/04/2022 14:29)       Diagnoses and all orders for this visit:    1. Seasonal allergic rhinitis due to pollen (Primary)  -     montelukast (SINGULAIR) 10 MG tablet; Take 1 tablet by mouth Daily for 90 days.  Dispense: 90 tablet; Refill: 3    2. Cervicalgia    Other orders  -     estradiol (ESTRACE) 0.5 MG tablet; Take 1 tablet by mouth Daily for 90 days.  Dispense: 90 tablet; Refill: 3          Smoking Cessation:    Shawanda Smith  reports that she has never smoked. She has never used smokeless tobacco.          Follow Up   Return in about 3 months (around 6/22/2022).  Patient was given instructions and counseling regarding her condition or for health maintenance advice. Please see specific information pulled into the AVS if appropriate.       Chacha Mae MD  Answers for HPI/ROS submitted by the patient on 3/22/2022  Please describe your symptoms.: Prescription renew  Have you had these symptoms before?: Yes  How long have you been having these symptoms?: Greater than 2 weeks  What  is the primary reason for your visit?: Other      Answers for HPI/ROS submitted by the patient on 3/22/2022  Please describe your symptoms.: Prescription renew  Have you had these symptoms before?: Yes  How long have you been having these symptoms?: Greater than 2 weeks  What is the primary reason for your visit?: Other

## 2022-04-12 DIAGNOSIS — F41.9 ANXIETY: ICD-10-CM

## 2022-04-12 RX ORDER — FLUOXETINE HYDROCHLORIDE 20 MG/1
20 CAPSULE ORAL DAILY
Qty: 90 CAPSULE | Refills: 0 | Status: SHIPPED | OUTPATIENT
Start: 2022-04-12 | End: 2022-07-18

## 2022-07-01 ENCOUNTER — APPOINTMENT (OUTPATIENT)
Dept: ULTRASOUND IMAGING | Facility: HOSPITAL | Age: 57
End: 2022-07-01

## 2022-07-11 ENCOUNTER — TRANSCRIBE ORDERS (OUTPATIENT)
Dept: ADMINISTRATIVE | Facility: HOSPITAL | Age: 57
End: 2022-07-11

## 2022-07-11 DIAGNOSIS — Z12.31 SCREENING MAMMOGRAM, ENCOUNTER FOR: Primary | ICD-10-CM

## 2022-07-15 DIAGNOSIS — F41.9 ANXIETY: ICD-10-CM

## 2022-07-18 RX ORDER — FLUOXETINE HYDROCHLORIDE 20 MG/1
20 CAPSULE ORAL DAILY
Qty: 90 CAPSULE | Refills: 0 | Status: SHIPPED | OUTPATIENT
Start: 2022-07-18

## 2022-08-01 ENCOUNTER — APPOINTMENT (OUTPATIENT)
Dept: ULTRASOUND IMAGING | Facility: HOSPITAL | Age: 57
End: 2022-08-01

## 2022-08-12 ENCOUNTER — TRANSCRIBE ORDERS (OUTPATIENT)
Dept: ADMINISTRATIVE | Facility: HOSPITAL | Age: 57
End: 2022-08-12

## 2022-08-12 DIAGNOSIS — R13.10 DYSPHAGIA, UNSPECIFIED TYPE: Primary | ICD-10-CM

## 2022-09-01 ENCOUNTER — HOSPITAL ENCOUNTER (OUTPATIENT)
Dept: ULTRASOUND IMAGING | Facility: HOSPITAL | Age: 57
Discharge: HOME | End: 2022-09-01
Admitting: NURSE PRACTITIONER

## 2022-09-01 DIAGNOSIS — D73.89 LESION OF SPLEEN: ICD-10-CM

## 2022-09-01 PROCEDURE — 76705 ECHO EXAM OF ABDOMEN: CPT

## 2022-09-06 ENCOUNTER — HOSPITAL ENCOUNTER (OUTPATIENT)
Dept: MAMMOGRAPHY | Facility: HOSPITAL | Age: 57
Discharge: HOME | End: 2022-09-06
Admitting: NURSE PRACTITIONER

## 2022-09-06 DIAGNOSIS — Z12.31 SCREENING MAMMOGRAM, ENCOUNTER FOR: ICD-10-CM

## 2022-09-06 PROCEDURE — 77063 BREAST TOMOSYNTHESIS BI: CPT

## 2022-09-06 PROCEDURE — 77067 SCR MAMMO BI INCL CAD: CPT

## 2022-09-12 ENCOUNTER — TRANSCRIBE ORDERS (OUTPATIENT)
Dept: ADMINISTRATIVE | Facility: HOSPITAL | Age: 57
End: 2022-09-12

## 2022-09-12 DIAGNOSIS — R92.8 ABNORMAL MAMMOGRAM: Primary | ICD-10-CM

## 2022-09-22 ENCOUNTER — HOSPITAL ENCOUNTER (OUTPATIENT)
Dept: ULTRASOUND IMAGING | Facility: HOSPITAL | Age: 57
Discharge: HOME | End: 2022-09-22

## 2022-09-22 ENCOUNTER — HOSPITAL ENCOUNTER (OUTPATIENT)
Dept: MAMMOGRAPHY | Facility: HOSPITAL | Age: 57
Discharge: HOME | End: 2022-09-22

## 2022-09-22 DIAGNOSIS — R92.8 ABNORMAL MAMMOGRAM: ICD-10-CM

## 2022-09-22 PROCEDURE — 76642 ULTRASOUND BREAST LIMITED: CPT

## 2022-09-22 PROCEDURE — G0279 TOMOSYNTHESIS, MAMMO: HCPCS

## 2022-09-22 PROCEDURE — 77066 DX MAMMO INCL CAD BI: CPT

## 2022-09-23 ENCOUNTER — HOSPITAL ENCOUNTER (OUTPATIENT)
Dept: GENERAL RADIOLOGY | Facility: HOSPITAL | Age: 57
Discharge: HOME | End: 2022-09-23
Admitting: NURSE PRACTITIONER

## 2022-09-23 DIAGNOSIS — R13.10 DYSPHAGIA, UNSPECIFIED TYPE: ICD-10-CM

## 2022-09-23 PROCEDURE — 92611 MOTION FLUOROSCOPY/SWALLOW: CPT

## 2022-09-23 PROCEDURE — 74230 X-RAY XM SWLNG FUNCJ C+: CPT

## 2022-09-23 RX ADMIN — BARIUM SULFATE 1 TEASPOON(S): 0.6 CREAM ORAL at 10:35

## 2022-09-23 RX ADMIN — BARIUM SULFATE 55 ML: 0.81 POWDER, FOR SUSPENSION ORAL at 10:35

## 2022-09-23 NOTE — MBS/VFSS/FEES
Outpatient - Speech Language Pathology   Swallow MBS  [facility]     Patient Name: Shawanda Smith  : 1965  MRN: [id number]  Today's Date: 2022               Admit Date: 2022    Visit Dx:     ICD-10-CM ICD-9-CM   1. Dysphagia, unspecified type  R13.10 787.20     Patient Active Problem List   Diagnosis   • Arm weakness   • Cervicalgia   • History of degenerative disc disease   • Difficulty in swallowing   • Dysphagia   • Headache   • History of herniated intervertebral disc   • Nausea   • Numbness and tingling in both hands   • Vertigo   • Low serum vitamin B12     Past Medical History:   Diagnosis Date   • Anxiety    • Arm weakness    • Arthritis    • Cervicalgia    • Difficulty in swallowing    • Dysphagia    • Headache    • History of herniated intervertebral disc    • Hx of degenerative disc disease    • Hyperlipidemia 2020   • Low back pain    • Migraines    • Nausea    • Numbness and tingling in both hands    • Seasonal allergies    • Vertigo    • Visual impairment      Past Surgical History:   Procedure Laterality Date   • CERVICAL FUSION      C5/6 Dr. Pate   • CHOLECYSTECTOMY     • ENDOMETRIAL ABLATION     • GALLBLADDER SURGERY     • HYSTERECTOMY     • JOINT REPLACEMENT     • LUMBAR PUNCTURE     • NECK SURGERY     • OTHER SURGICAL HISTORY      Metal Implants   PERTINENT INFORMATION:  Patient is a 56year old female referred for modified barium swallow study due to report of globus sensation with solids.  Patient with history of cervical spine surgery with most recent surgery in January of this year.  Patient reports effortful swallowing when eating certain things such as Triscuits..    Patient was referred for an MBSS by Dr. Gonsalez to rule out aspiration as well as to determine appropriate treatment plan for this patient.      PROCEDURE:    Patient was alert and cooperative.  The patient was viewed in lateral projection.  The following Ba consistencies were administered: Full  range of consistencies with thin liquids from spoon cup and straw, purée consistencies and regular solids.  The following compensatory swallowing strategies were performed: N/A      RESULTS:    1.  Oral stage is characterized by good bolus preparation and control.  Timely oral transit.  Pharyngeal phase is timely with good laryngeal elevation, base of tongue retraction and epiglottic retroflexion.  No penetration or aspiration noted for any trial presented.  No pharyngeal residue noted after the swallow.  Patient did report globus sensation when swallowing solids however no pharyngeal residue was noted after completion of swallow.      IMPRESSIONS:    Patient demonstrated functional oropharyngeal swallow negative for penetration, aspiration or pharyngeal residue.     FUNCTIONAL DEFICIT: Patient scored level 7 of 7 on Functional Communication Measures for swallowing indicating a 0% limitation in function for current status, goal status, and discharge status.      RECOMMENDATIONS:   1.  Regular diet-thin liquids  2.  90 degrees upright for all intake      YES: Patient/responsible party agrees with the plan of care and has been informed of all alternatives, risks and benefits.    Thank you for this referral.    EDUCATION  The patient has been educated in the following areas:   Dysphagia (Swallowing Impairment).          Jazmyn Pink, SLP  9/23/2022

## 2022-10-19 ENCOUNTER — APPOINTMENT (OUTPATIENT)
Dept: GENERAL RADIOLOGY | Facility: HOSPITAL | Age: 57
End: 2022-10-19

## 2022-10-19 ENCOUNTER — HOSPITAL ENCOUNTER (EMERGENCY)
Facility: HOSPITAL | Age: 57
Discharge: HOME | End: 2022-10-19
Attending: EMERGENCY MEDICINE | Admitting: EMERGENCY MEDICINE

## 2022-10-19 VITALS
TEMPERATURE: 98.3 F | WEIGHT: 183.2 LBS | DIASTOLIC BLOOD PRESSURE: 66 MMHG | RESPIRATION RATE: 19 BRPM | OXYGEN SATURATION: 96 % | BODY MASS INDEX: 32.46 KG/M2 | SYSTOLIC BLOOD PRESSURE: 105 MMHG | HEIGHT: 63 IN | HEART RATE: 55 BPM

## 2022-10-19 DIAGNOSIS — K21.00 GASTROESOPHAGEAL REFLUX DISEASE WITH ESOPHAGITIS WITHOUT HEMORRHAGE: Primary | ICD-10-CM

## 2022-10-19 LAB
ALBUMIN SERPL-MCNC: 4.4 G/DL (ref 3.5–5.2)
ALBUMIN/GLOB SERPL: 1.7 G/DL
ALP SERPL-CCNC: 80 U/L (ref 39–117)
ALT SERPL W P-5'-P-CCNC: 14 U/L (ref 1–33)
ANION GAP SERPL CALCULATED.3IONS-SCNC: 10 MMOL/L (ref 5–15)
AST SERPL-CCNC: 18 U/L (ref 1–32)
BASOPHILS # BLD AUTO: 0.04 10*3/MM3 (ref 0–0.2)
BASOPHILS NFR BLD AUTO: 0.6 % (ref 0–1.5)
BILIRUB SERPL-MCNC: 0.4 MG/DL (ref 0–1.2)
BUN SERPL-MCNC: 12 MG/DL (ref 6–20)
BUN/CREAT SERPL: 11.5 (ref 7–25)
CALCIUM SPEC-SCNC: 9.3 MG/DL (ref 8.6–10.5)
CHLORIDE SERPL-SCNC: 103 MMOL/L (ref 98–107)
CO2 SERPL-SCNC: 27 MMOL/L (ref 22–29)
CREAT SERPL-MCNC: 1.04 MG/DL (ref 0.57–1)
DEPRECATED RDW RBC AUTO: 42.5 FL (ref 37–54)
EGFRCR SERPLBLD CKD-EPI 2021: 64 ML/MIN/1.73
EOSINOPHIL # BLD AUTO: 0.32 10*3/MM3 (ref 0–0.4)
EOSINOPHIL NFR BLD AUTO: 5 % (ref 0.3–6.2)
ERYTHROCYTE [DISTWIDTH] IN BLOOD BY AUTOMATED COUNT: 12.9 % (ref 12.3–15.4)
GLOBULIN UR ELPH-MCNC: 2.6 GM/DL
GLUCOSE SERPL-MCNC: 114 MG/DL (ref 65–99)
HCT VFR BLD AUTO: 42.2 % (ref 34–46.6)
HGB BLD-MCNC: 13.9 G/DL (ref 12–15.9)
HOLD SPECIMEN: NORMAL
IMM GRANULOCYTES # BLD AUTO: 0.01 10*3/MM3 (ref 0–0.05)
IMM GRANULOCYTES NFR BLD AUTO: 0.2 % (ref 0–0.5)
LIPASE SERPL-CCNC: 13 U/L (ref 13–60)
LYMPHOCYTES # BLD AUTO: 3.04 10*3/MM3 (ref 0.7–3.1)
LYMPHOCYTES NFR BLD AUTO: 47.4 % (ref 19.6–45.3)
MAGNESIUM SERPL-MCNC: 1.8 MG/DL (ref 1.6–2.6)
MCH RBC QN AUTO: 29.5 PG (ref 26.6–33)
MCHC RBC AUTO-ENTMCNC: 32.9 G/DL (ref 31.5–35.7)
MCV RBC AUTO: 89.6 FL (ref 79–97)
MONOCYTES # BLD AUTO: 0.47 10*3/MM3 (ref 0.1–0.9)
MONOCYTES NFR BLD AUTO: 7.3 % (ref 5–12)
NEUTROPHILS NFR BLD AUTO: 2.53 10*3/MM3 (ref 1.7–7)
NEUTROPHILS NFR BLD AUTO: 39.5 % (ref 42.7–76)
NRBC BLD AUTO-RTO: 0 /100 WBC (ref 0–0.2)
NT-PROBNP SERPL-MCNC: 30.1 PG/ML (ref 0–900)
PLATELET # BLD AUTO: 215 10*3/MM3 (ref 140–450)
PMV BLD AUTO: 11.9 FL (ref 6–12)
POTASSIUM SERPL-SCNC: 3.7 MMOL/L (ref 3.5–5.2)
PROT SERPL-MCNC: 7 G/DL (ref 6–8.5)
RBC # BLD AUTO: 4.71 10*6/MM3 (ref 3.77–5.28)
SODIUM SERPL-SCNC: 140 MMOL/L (ref 136–145)
TROPONIN I SERPL-MCNC: 0 NG/ML (ref 0–0.08)
TROPONIN I SERPL-MCNC: 0 NG/ML (ref 0–0.08)
WBC NRBC COR # BLD: 6.41 10*3/MM3 (ref 3.4–10.8)
WHOLE BLOOD HOLD COAG: NORMAL
WHOLE BLOOD HOLD SPECIMEN: NORMAL

## 2022-10-19 PROCEDURE — 80053 COMPREHEN METABOLIC PANEL: CPT

## 2022-10-19 PROCEDURE — 84484 ASSAY OF TROPONIN QUANT: CPT

## 2022-10-19 PROCEDURE — 83690 ASSAY OF LIPASE: CPT

## 2022-10-19 PROCEDURE — 96374 THER/PROPH/DIAG INJ IV PUSH: CPT

## 2022-10-19 PROCEDURE — 71045 X-RAY EXAM CHEST 1 VIEW: CPT

## 2022-10-19 PROCEDURE — 93005 ELECTROCARDIOGRAM TRACING: CPT

## 2022-10-19 PROCEDURE — 36415 COLL VENOUS BLD VENIPUNCTURE: CPT

## 2022-10-19 PROCEDURE — 85025 COMPLETE CBC W/AUTO DIFF WBC: CPT

## 2022-10-19 PROCEDURE — 93005 ELECTROCARDIOGRAM TRACING: CPT | Performed by: EMERGENCY MEDICINE

## 2022-10-19 PROCEDURE — 99284 EMERGENCY DEPT VISIT MOD MDM: CPT

## 2022-10-19 PROCEDURE — 83735 ASSAY OF MAGNESIUM: CPT

## 2022-10-19 PROCEDURE — 93010 ELECTROCARDIOGRAM REPORT: CPT | Performed by: INTERNAL MEDICINE

## 2022-10-19 PROCEDURE — 83880 ASSAY OF NATRIURETIC PEPTIDE: CPT

## 2022-10-19 RX ORDER — OMEPRAZOLE 40 MG/1
40 CAPSULE, DELAYED RELEASE ORAL DAILY
Qty: 14 CAPSULE | Refills: 0 | Status: SHIPPED | OUTPATIENT
Start: 2022-10-19

## 2022-10-19 RX ORDER — LIDOCAINE HYDROCHLORIDE 20 MG/ML
15 SOLUTION OROPHARYNGEAL ONCE
Status: COMPLETED | OUTPATIENT
Start: 2022-10-19 | End: 2022-10-19

## 2022-10-19 RX ORDER — SODIUM CHLORIDE 0.9 % (FLUSH) 0.9 %
10 SYRINGE (ML) INJECTION AS NEEDED
Status: DISCONTINUED | OUTPATIENT
Start: 2022-10-19 | End: 2022-10-19 | Stop reason: HOSPADM

## 2022-10-19 RX ORDER — FAMOTIDINE 10 MG/ML
20 INJECTION, SOLUTION INTRAVENOUS ONCE
Status: COMPLETED | OUTPATIENT
Start: 2022-10-19 | End: 2022-10-19

## 2022-10-19 RX ORDER — ASPIRIN 81 MG/1
324 TABLET, CHEWABLE ORAL ONCE
Status: COMPLETED | OUTPATIENT
Start: 2022-10-19 | End: 2022-10-19

## 2022-10-19 RX ORDER — SUCRALFATE 1 G/1
1 TABLET ORAL 4 TIMES DAILY
Qty: 28 TABLET | Refills: 0 | Status: SHIPPED | OUTPATIENT
Start: 2022-10-19

## 2022-10-19 RX ORDER — ALUMINA, MAGNESIA, AND SIMETHICONE 2400; 2400; 240 MG/30ML; MG/30ML; MG/30ML
15 SUSPENSION ORAL ONCE
Status: COMPLETED | OUTPATIENT
Start: 2022-10-19 | End: 2022-10-19

## 2022-10-19 RX ADMIN — LIDOCAINE HYDROCHLORIDE 15 ML: 20 SOLUTION ORAL at 13:28

## 2022-10-19 RX ADMIN — ALUMINUM HYDROXIDE, MAGNESIUM HYDROXIDE, AND DIMETHICONE 15 ML: 400; 400; 40 SUSPENSION ORAL at 13:28

## 2022-10-19 RX ADMIN — FAMOTIDINE 20 MG: 10 INJECTION INTRAVENOUS at 13:28

## 2022-10-19 RX ADMIN — ASPIRIN 81 MG 324 MG: 81 TABLET ORAL at 12:21

## 2022-10-19 NOTE — DISCHARGE INSTRUCTIONS
Return to the emergency department immediately for worsening of symptoms despite diet change and the medication we have prescribed today.

## 2022-10-19 NOTE — ED PROVIDER NOTES
"Time: 1:06 PM EDT  Arrived by: private car  Chief Complaint: chest pain, nausea  History provided by: pt  History is limited by: N/A     History of Present Illness:  Patient is a 56 y.o. year old female who presents to the emergency department with midsternally chest pain and nausea. The pt reports that the pain is radiating up in to jaw. Pt reports the chest pain is in the same location as her heartburn. Pt quotes she has \"GERD.\" Pt describes the pain as pressure and burning. Pt reports throat tightness and spasms. Pt felt the chest pain 20 minutes ago. Pt denies vomiting, fever, SOB, and cough.      History provided by:  Patient   used: No        Similar Symptoms Previously: no  Recently seen: no      Patient Care Team  Primary Care Provider: Haydee Jolly APRN    Past Medical History:     Allergies   Allergen Reactions   • Morphine Itching and Palpitations     Chest tightness     • Penicillins Rash     Rash       Past Medical History:   Diagnosis Date   • Anxiety    • Arm weakness    • Arthritis    • Cervicalgia    • Difficulty in swallowing    • Dysphagia    • Headache    • History of herniated intervertebral disc    • Hx of degenerative disc disease    • Hyperlipidemia 2020   • Low back pain    • Migraines    • Nausea    • Numbness and tingling in both hands    • Seasonal allergies    • Vertigo    • Visual impairment      Past Surgical History:   Procedure Laterality Date   • CERVICAL FUSION  2002    C5/6 Dr. Pate   • CHOLECYSTECTOMY     • ENDOMETRIAL ABLATION     • GALLBLADDER SURGERY     • HYSTERECTOMY     • JOINT REPLACEMENT     • LUMBAR PUNCTURE     • NECK SURGERY     • OTHER SURGICAL HISTORY      Metal Implants     Family History   Problem Relation Age of Onset   • Hypertension Mother    • COPD Mother    • Heart disease Father    • Arthritis Father    • Cancer Maternal Grandmother    • Heart disease Paternal Grandfather    • Cancer Other        Home Medications:  Prior to " Admission medications    Medication Sig Start Date End Date Taking? Authorizing Provider   baclofen (LIORESAL) 20 MG tablet  3/20/22   Lynda Bansal MD   diclofenac (VOLTAREN) 75 MG EC tablet Take 75 mg by mouth 2 (Two) Times a Day. 3/12/21   Emergency, Nurse Epic, RN   estradiol (ESTRACE) 0.5 MG tablet Take 1 tablet by mouth Daily for 90 days. 3/22/22 6/20/22  Chacha Mae MD   FLUoxetine (PROzac) 20 MG capsule TAKE 1 CAPSULE BY MOUTH DAILY 7/18/22   Chacha Mae MD   gabapentin (NEURONTIN) 300 MG capsule  3/21/22   Lynda Bansal MD   lidocaine (LIDODERM) 5 % lidocaine 5 % topical adhesive patch,medicated apply 1 patch by transdermal route once daily (May wear up to 12hours.)   Active 3/31/21   Emergency, Nurse Epic, RN   meclizine (ANTIVERT) 25 MG tablet Take 25 mg by mouth 2 (Two) Times a Day As Needed. 8/9/21   Lynda Bansal MD   montelukast (SINGULAIR) 10 MG tablet Take 1 tablet by mouth Daily for 90 days. 3/22/22 6/20/22  Chacha Mae MD   SUMAtriptan (IMITREX) 50 MG tablet Take 50 mg by mouth. 8/12/21   Lynda Bansal MD        Social History:   Social History     Tobacco Use   • Smoking status: Never   • Smokeless tobacco: Never   Vaping Use   • Vaping Use: Never used   Substance Use Topics   • Alcohol use: Never   • Drug use: Never     Recent travel: not applicable     Review of Systems:  Review of Systems   Constitutional: Negative for chills and fever.   HENT: Negative for congestion, rhinorrhea and sore throat.    Eyes: Negative for photophobia.   Respiratory: Negative for apnea, cough, chest tightness and shortness of breath.    Cardiovascular: Positive for chest pain. Negative for palpitations.   Gastrointestinal: Positive for nausea. Negative for abdominal pain, diarrhea and vomiting.   Endocrine: Negative.    Genitourinary: Negative for difficulty urinating and dysuria.   Musculoskeletal: Negative for back pain, joint swelling and myalgias.   Skin:  "Negative for color change and wound.   Allergic/Immunologic: Negative.    Neurological: Negative for seizures and headaches.   Psychiatric/Behavioral: Negative.    All other systems reviewed and are negative.       Physical Exam:  /66   Pulse 55   Temp 98.3 °F (36.8 °C) (Oral)   Resp 19   Ht 160 cm (63\")   Wt 83.1 kg (183 lb 3.2 oz)   SpO2 96%   BMI 32.45 kg/m²     Physical Exam  Vitals and nursing note reviewed.   Constitutional:       General: She is awake.      Appearance: Normal appearance.   HENT:      Head: Normocephalic and atraumatic.      Nose: Nose normal.      Mouth/Throat:      Mouth: Mucous membranes are moist.   Eyes:      Extraocular Movements: Extraocular movements intact.      Pupils: Pupils are equal, round, and reactive to light.   Cardiovascular:      Rate and Rhythm: Normal rate and regular rhythm.      Heart sounds: Normal heart sounds.   Pulmonary:      Effort: Pulmonary effort is normal. No respiratory distress.      Breath sounds: Normal breath sounds. No wheezing, rhonchi or rales.   Abdominal:      General: Bowel sounds are normal.      Palpations: Abdomen is soft.      Tenderness: There is no abdominal tenderness. There is no guarding or rebound.      Comments: No rigidity   Musculoskeletal:         General: No tenderness. Normal range of motion.      Cervical back: Normal range of motion and neck supple.   Skin:     General: Skin is warm and dry.      Coloration: Skin is not jaundiced.   Neurological:      General: No focal deficit present.      Mental Status: She is alert and oriented to person, place, and time. Mental status is at baseline.      Sensory: Sensation is intact.      Motor: Motor function is intact.      Coordination: Coordination is intact.   Psychiatric:         Attention and Perception: Attention and perception normal.         Mood and Affect: Mood and affect normal.         Speech: Speech normal.         Behavior: Behavior normal.         Judgment: " Judgment normal.                Medications in the Emergency Department:  Medications   sodium chloride 0.9 % flush 10 mL (has no administration in time range)   aspirin chewable tablet 324 mg (324 mg Oral Given 10/19/22 1221)   famotidine (PEPCID) injection 20 mg (20 mg Intravenous Given 10/19/22 1328)   aluminum-magnesium hydroxide-simethicone (MAALOX MAX) 400-400-40 MG/5ML suspension 15 mL (15 mL Oral Given 10/19/22 1328)   Lidocaine Viscous HCl (XYLOCAINE) 2 % solution 15 mL (15 mL Mouth/Throat Given 10/19/22 1328)        Labs  Lab Results (last 24 hours)     Procedure Component Value Units Date/Time    CBC & Differential [[id number]]  (Abnormal) Collected: 10/19/22 1141    Specimen: Blood Updated: 10/19/22 1207    Narrative:      The following orders were created for panel order CBC & Differential.  Procedure                               Abnormality         Status                     ---------                               -----------         ------                     CBC Auto Differential[[id number]]        Abnormal            Final result                 Please view results for these tests on the individual orders.    Comprehensive Metabolic Panel [[id number]]  (Abnormal) Collected: 10/19/22 1141    Specimen: Blood Updated: 10/19/22 1233     Glucose 114 mg/dL      BUN 12 mg/dL      Creatinine 1.04 mg/dL      Sodium 140 mmol/L      Potassium 3.7 mmol/L      Chloride 103 mmol/L      CO2 27.0 mmol/L      Calcium 9.3 mg/dL      Total Protein 7.0 g/dL      Albumin 4.40 g/dL      ALT (SGPT) 14 U/L      AST (SGOT) 18 U/L      Alkaline Phosphatase 80 U/L      Total Bilirubin 0.4 mg/dL      Globulin 2.6 gm/dL      A/G Ratio 1.7 g/dL      BUN/Creatinine Ratio 11.5     Anion Gap 10.0 mmol/L      eGFR 64.0 mL/min/1.73      Comment: National Kidney Foundation and American Society of Nephrology (ASN) Task Force recommended calculation based on the Chronic Kidney Disease Epidemiology Collaboration (CKD-EPI) equation refit  without adjustment for race.       Narrative:      GFR Normal >60  Chronic Kidney Disease <60  Kidney Failure <15      Lipase [[id number]]  (Normal) Collected: 10/19/22 1141    Specimen: Blood Updated: 10/19/22 1233     Lipase 13 U/L     BNP [[id number]]  (Normal) Collected: 10/19/22 1141    Specimen: Blood Updated: 10/19/22 1228     proBNP 30.1 pg/mL     Narrative:      Among patients with dyspnea, NT-proBNP is highly sensitive for the detection of acute congestive heart failure. In addition NT-proBNP of <300 pg/ml effectively rules out acute congestive heart failure with 99% negative predictive value.    Results may be falsely decreased if patient taking Biotin.      Magnesium [[id number]]  (Normal) Collected: 10/19/22 1141    Specimen: Blood Updated: 10/19/22 1233     Magnesium 1.8 mg/dL     CBC Auto Differential [[id number]]  (Abnormal) Collected: 10/19/22 1141    Specimen: Blood Updated: 10/19/22 1207     WBC 6.41 10*3/mm3      RBC 4.71 10*6/mm3      Hemoglobin 13.9 g/dL      Hematocrit 42.2 %      MCV 89.6 fL      MCH 29.5 pg      MCHC 32.9 g/dL      RDW 12.9 %      RDW-SD 42.5 fl      MPV 11.9 fL      Platelets 215 10*3/mm3      Neutrophil % 39.5 %      Lymphocyte % 47.4 %      Monocyte % 7.3 %      Eosinophil % 5.0 %      Basophil % 0.6 %      Immature Grans % 0.2 %      Neutrophils, Absolute 2.53 10*3/mm3      Lymphocytes, Absolute 3.04 10*3/mm3      Monocytes, Absolute 0.47 10*3/mm3      Eosinophils, Absolute 0.32 10*3/mm3      Basophils, Absolute 0.04 10*3/mm3      Immature Grans, Absolute 0.01 10*3/mm3      nRBC 0.0 /100 WBC     POC Troponin I [[id number]]  (Normal) Collected: 10/19/22 1148    Specimen: Blood Updated: 10/19/22 1201     Troponin I 0.00 ng/mL      Comment: Serial Number: [id number]:  [id number]       POC Troponin I with Hold Tube [[id number]] Collected: 10/19/22 1320    Specimen: Blood Updated: 10/19/22 1332    Narrative:      The following orders were created for panel order POC Troponin I with  Hold Tube.  Procedure                               Abnormality         Status                     ---------                               -----------         ------                     POC Troponin I[[id number]]                                                              HOLD Troponin-I Tube[[id number]]                             Final result                 Please view results for these tests on the individual orders.    POC Troponin I [[id number]]  (Normal) Collected: 10/19/22 1320    Specimen: Blood Updated: 10/19/22 1332     Troponin I 0.00 ng/mL      Comment: Serial Number: [id number]:  [id number]              Imaging:  XR Chest 1 View    Result Date: 10/19/2022  PROCEDURE: XR CHEST 1 VW  COMPARISON: [facility], , CHEST PA/AP & LAT 2V, 1/17/2021, 14:42.  INDICATIONS: Chest Pain Triage Protocol  FINDINGS:   The lungs are well-expanded. The heart and pulmonary vasculature are within normal limits. No pleural effusions are identified. There are no active appearing infiltrates.  No evidence of pneumothorax.  Scoliosis is present.  ACDF in the lower cervical spine.  IMPRESSION: No active disease.  ALYSHA ORTA MD       Electronically Signed and Approved By: ALYSHA ORTA MD on 10/19/2022 at 12:39               Procedures:  Procedures    Progress                            Medical Decision Making:  MDM  Number of Diagnoses or Management Options  Gastroesophageal reflux disease with esophagitis without hemorrhage: new and requires workup     Amount and/or Complexity of Data Reviewed  Clinical lab tests: reviewed  Tests in the radiology section of CPT®: reviewed  Tests in the medicine section of CPT®: reviewed  Independent visualization of images, tracings, or specimens: yes    Risk of Complications, Morbidity, and/or Mortality  Presenting problems: moderate  Management options: low    Patient Progress  Patient progress: improved       Final diagnoses:   Gastroesophageal reflux disease with  esophagitis without hemorrhage        Disposition:  ED Disposition     ED Disposition   Discharge    Condition   Stable    Comment   --             This medical record created using voice recognition software.        Documentation assistance provided by Anupama Liang acting as scribe for David Macario MD. Information recorded by the scribe was done at my direction and has been verified and validated by me.          Anupama Liang  10/19/22 1576       David Macario MD  10/19/22 0754

## 2022-10-21 LAB
QT INTERVAL: 383 MS
QT INTERVAL: 413 MS

## 2023-08-25 ENCOUNTER — TRANSCRIBE ORDERS (OUTPATIENT)
Dept: ADMINISTRATIVE | Facility: HOSPITAL | Age: 58
End: 2023-08-25
Payer: COMMERCIAL

## 2023-08-25 DIAGNOSIS — Z12.31 SCREENING MAMMOGRAM FOR BREAST CANCER: Primary | ICD-10-CM

## 2023-12-08 ENCOUNTER — HOSPITAL ENCOUNTER (OUTPATIENT)
Dept: MAMMOGRAPHY | Facility: HOSPITAL | Age: 58
Discharge: HOME | End: 2023-12-08
Admitting: NURSE PRACTITIONER
Payer: COMMERCIAL

## 2023-12-08 DIAGNOSIS — Z12.31 SCREENING MAMMOGRAM FOR BREAST CANCER: ICD-10-CM

## 2023-12-08 PROCEDURE — 77063 BREAST TOMOSYNTHESIS BI: CPT

## 2023-12-08 PROCEDURE — 77067 SCR MAMMO BI INCL CAD: CPT

## 2024-04-01 ENCOUNTER — HOSPITAL ENCOUNTER (EMERGENCY)
Facility: HOSPITAL | Age: 59
Discharge: HOME | End: 2024-04-01
Attending: EMERGENCY MEDICINE | Admitting: EMERGENCY MEDICINE
Payer: COMMERCIAL

## 2024-04-01 ENCOUNTER — APPOINTMENT (OUTPATIENT)
Dept: CT IMAGING | Facility: HOSPITAL | Age: 59
End: 2024-04-01
Payer: COMMERCIAL

## 2024-04-01 VITALS
WEIGHT: 192.24 LBS | RESPIRATION RATE: 16 BRPM | DIASTOLIC BLOOD PRESSURE: 72 MMHG | HEIGHT: 63 IN | SYSTOLIC BLOOD PRESSURE: 117 MMHG | TEMPERATURE: 97.8 F | OXYGEN SATURATION: 98 % | BODY MASS INDEX: 34.06 KG/M2 | HEART RATE: 78 BPM

## 2024-04-01 DIAGNOSIS — H11.31 SUBCONJUNCTIVAL HEMORRHAGE OF RIGHT EYE: Primary | ICD-10-CM

## 2024-04-01 DIAGNOSIS — R51.9 ACUTE NONINTRACTABLE HEADACHE, UNSPECIFIED HEADACHE TYPE: ICD-10-CM

## 2024-04-01 LAB
ALBUMIN SERPL-MCNC: 4.1 G/DL (ref 3.5–5.2)
ALBUMIN/GLOB SERPL: 1.6 G/DL
ALP SERPL-CCNC: 63 U/L (ref 39–117)
ALT SERPL W P-5'-P-CCNC: 11 U/L (ref 1–33)
ANION GAP SERPL CALCULATED.3IONS-SCNC: 7.9 MMOL/L (ref 5–15)
AST SERPL-CCNC: 20 U/L (ref 1–32)
BASOPHILS # BLD AUTO: 0.04 10*3/MM3 (ref 0–0.2)
BASOPHILS NFR BLD AUTO: 0.9 % (ref 0–1.5)
BILIRUB SERPL-MCNC: 0.3 MG/DL (ref 0–1.2)
BILIRUB UR QL STRIP: NEGATIVE
BUN SERPL-MCNC: 15 MG/DL (ref 6–20)
BUN/CREAT SERPL: 15.3 (ref 7–25)
CALCIUM SPEC-SCNC: 9.6 MG/DL (ref 8.6–10.5)
CHLORIDE SERPL-SCNC: 99 MMOL/L (ref 98–107)
CLARITY UR: CLEAR
CO2 SERPL-SCNC: 29.1 MMOL/L (ref 22–29)
COLOR UR: YELLOW
CREAT SERPL-MCNC: 0.98 MG/DL (ref 0.57–1)
DEPRECATED RDW RBC AUTO: 41.4 FL (ref 37–54)
EGFRCR SERPLBLD CKD-EPI 2021: 67.9 ML/MIN/1.73
EOSINOPHIL # BLD AUTO: 0.32 10*3/MM3 (ref 0–0.4)
EOSINOPHIL NFR BLD AUTO: 7.1 % (ref 0.3–6.2)
ERYTHROCYTE [DISTWIDTH] IN BLOOD BY AUTOMATED COUNT: 12.8 % (ref 12.3–15.4)
GLOBULIN UR ELPH-MCNC: 2.5 GM/DL
GLUCOSE SERPL-MCNC: 94 MG/DL (ref 65–99)
GLUCOSE UR STRIP-MCNC: NEGATIVE MG/DL
HCT VFR BLD AUTO: 41.7 % (ref 34–46.6)
HGB BLD-MCNC: 13.6 G/DL (ref 12–15.9)
HGB UR QL STRIP.AUTO: NEGATIVE
HOLD SPECIMEN: NORMAL
HOLD SPECIMEN: NORMAL
IMM GRANULOCYTES # BLD AUTO: 0.01 10*3/MM3 (ref 0–0.05)
IMM GRANULOCYTES NFR BLD AUTO: 0.2 % (ref 0–0.5)
KETONES UR QL STRIP: NEGATIVE
LEUKOCYTE ESTERASE UR QL STRIP.AUTO: NEGATIVE
LYMPHOCYTES # BLD AUTO: 2.24 10*3/MM3 (ref 0.7–3.1)
LYMPHOCYTES NFR BLD AUTO: 49.7 % (ref 19.6–45.3)
MAGNESIUM SERPL-MCNC: 1.9 MG/DL (ref 1.6–2.6)
MCH RBC QN AUTO: 29.1 PG (ref 26.6–33)
MCHC RBC AUTO-ENTMCNC: 32.6 G/DL (ref 31.5–35.7)
MCV RBC AUTO: 89.1 FL (ref 79–97)
MONOCYTES # BLD AUTO: 0.36 10*3/MM3 (ref 0.1–0.9)
MONOCYTES NFR BLD AUTO: 8 % (ref 5–12)
NEUTROPHILS NFR BLD AUTO: 1.54 10*3/MM3 (ref 1.7–7)
NEUTROPHILS NFR BLD AUTO: 34.1 % (ref 42.7–76)
NITRITE UR QL STRIP: NEGATIVE
NRBC BLD AUTO-RTO: 0 /100 WBC (ref 0–0.2)
PH UR STRIP.AUTO: 6.5 [PH] (ref 5–8)
PLATELET # BLD AUTO: 183 10*3/MM3 (ref 140–450)
PMV BLD AUTO: 11.2 FL (ref 6–12)
POTASSIUM SERPL-SCNC: 4.6 MMOL/L (ref 3.5–5.2)
PROT SERPL-MCNC: 6.6 G/DL (ref 6–8.5)
PROT UR QL STRIP: NEGATIVE
QT INTERVAL: 401 MS
QTC INTERVAL: 419 MS
RBC # BLD AUTO: 4.68 10*6/MM3 (ref 3.77–5.28)
SODIUM SERPL-SCNC: 136 MMOL/L (ref 136–145)
SP GR UR STRIP: <=1.005 (ref 1–1.03)
TROPONIN T SERPL HS-MCNC: <6 NG/L
UROBILINOGEN UR QL STRIP: NORMAL
WBC NRBC COR # BLD AUTO: 4.51 10*3/MM3 (ref 3.4–10.8)
WHOLE BLOOD HOLD COAG: NORMAL
WHOLE BLOOD HOLD SPECIMEN: NORMAL

## 2024-04-01 PROCEDURE — 93005 ELECTROCARDIOGRAM TRACING: CPT

## 2024-04-01 PROCEDURE — 70450 CT HEAD/BRAIN W/O DYE: CPT

## 2024-04-01 PROCEDURE — 83735 ASSAY OF MAGNESIUM: CPT

## 2024-04-01 PROCEDURE — 81003 URINALYSIS AUTO W/O SCOPE: CPT | Performed by: EMERGENCY MEDICINE

## 2024-04-01 PROCEDURE — 99284 EMERGENCY DEPT VISIT MOD MDM: CPT

## 2024-04-01 PROCEDURE — 36415 COLL VENOUS BLD VENIPUNCTURE: CPT

## 2024-04-01 PROCEDURE — 25810000003 SODIUM CHLORIDE 0.9 % SOLUTION

## 2024-04-01 PROCEDURE — 80053 COMPREHEN METABOLIC PANEL: CPT

## 2024-04-01 PROCEDURE — 84484 ASSAY OF TROPONIN QUANT: CPT

## 2024-04-01 PROCEDURE — 85025 COMPLETE CBC W/AUTO DIFF WBC: CPT

## 2024-04-01 RX ORDER — SODIUM CHLORIDE 0.9 % (FLUSH) 0.9 %
10 SYRINGE (ML) INJECTION AS NEEDED
Status: DISCONTINUED | OUTPATIENT
Start: 2024-04-01 | End: 2024-04-01 | Stop reason: HOSPADM

## 2024-04-01 RX ADMIN — SODIUM CHLORIDE 1000 ML: 9 INJECTION, SOLUTION INTRAVENOUS at 11:37

## 2024-04-01 NOTE — ED PROVIDER NOTES
Time: 10:47 AM EDT  Date of encounter:  4/1/2024  Independent Historian/Clinical History and Information was obtained by:   Patient    History is limited by: N/A    Chief Complaint   Patient presents with    Dizziness    Headache    Eye Problem         History of Present Illness:  Patient is a 58 y.o. year old female who presents to the emergency department for evaluation of right-sided headache with dizziness that started last night.  Patient also states that she woke up this morning and noticed the redness on her right eye.  Feels like she popped a blood vessel in her right eye.  States that this has happened in the past and looks similar.  Denies any eye pain with movement.  Denies any eye drainage.  Denies any fever, chills, vomiting.  She has had nausea this morning.       Patient Care Team  Primary Care Provider: Haydee Jolly APRN    Past Medical History:     Allergies   Allergen Reactions    Morphine Itching and Palpitations     Chest tightness      Penicillins Rash     Rash       Past Medical History:   Diagnosis Date    Anxiety     Arm weakness     Arthritis     Cervicalgia     Difficulty in swallowing     Dysphagia     Headache     History of herniated intervertebral disc     Hx of degenerative disc disease     Hyperlipidemia 2020    Low back pain     Migraines     Nausea     Numbness and tingling in both hands     Seasonal allergies     Vertigo     Visual impairment      Past Surgical History:   Procedure Laterality Date    CERVICAL FUSION  2002    C5/6 Dr. Pate    CHOLECYSTECTOMY      ENDOMETRIAL ABLATION      GALLBLADDER SURGERY      HYSTERECTOMY      JOINT REPLACEMENT      LUMBAR PUNCTURE      NECK SURGERY      OTHER SURGICAL HISTORY      Metal Implants     Family History   Problem Relation Age of Onset    Hypertension Mother     COPD Mother     Heart disease Father     Arthritis Father     Cancer Maternal Grandmother     Heart disease Paternal Grandfather     Cancer Other        Home  Medications:  Prior to Admission medications    Medication Sig Start Date End Date Taking? Authorizing Provider   baclofen (LIORESAL) 20 MG tablet  3/20/22   Lynda Bansal MD   diclofenac (VOLTAREN) 75 MG EC tablet Take 75 mg by mouth 2 (Two) Times a Day. 3/12/21   Emergency, Nurse Epic, RN   estradiol (ESTRACE) 0.5 MG tablet Take 1 tablet by mouth Daily for 90 days. 3/22/22 6/20/22  Chacha Mae MD   FLUoxetine (PROzac) 20 MG capsule TAKE 1 CAPSULE BY MOUTH DAILY 7/18/22   Chacha Mae MD   gabapentin (NEURONTIN) 300 MG capsule  3/21/22   Lynda Bansal MD   lidocaine (LIDODERM) 5 % lidocaine 5 % topical adhesive patch,medicated apply 1 patch by transdermal route once daily (May wear up to 12hours.)   Active 3/31/21   Emergency, Nurse Epic, RN   meclizine (ANTIVERT) 25 MG tablet Take 25 mg by mouth 2 (Two) Times a Day As Needed. 8/9/21   Lynda Bansal MD   montelukast (SINGULAIR) 10 MG tablet Take 1 tablet by mouth Daily for 90 days. 3/22/22 6/20/22  Chacha Mae MD   omeprazole (priLOSEC) 40 MG capsule Take 1 capsule by mouth Daily. 10/19/22   David Macario MD   sucralfate (CARAFATE) 1 g tablet Take 1 tablet by mouth 4 (Four) Times a Day. 10/19/22   David Macario MD   SUMAtriptan (IMITREX) 50 MG tablet Take 50 mg by mouth. 8/12/21   Lynda Bansal MD        Social History:   Social History     Tobacco Use    Smoking status: Never    Smokeless tobacco: Never   Vaping Use    Vaping status: Never Used   Substance Use Topics    Alcohol use: Never    Drug use: Never         Review of Systems:  Review of Systems   Constitutional:  Negative for chills and fever.   HENT:  Negative for congestion, rhinorrhea and sore throat.    Eyes:  Positive for redness. Negative for pain and visual disturbance.   Respiratory:  Negative for apnea, cough, chest tightness and shortness of breath.    Cardiovascular:  Negative for chest pain and palpitations.   Gastrointestinal:   "Negative for abdominal pain, diarrhea, nausea and vomiting.   Genitourinary:  Negative for difficulty urinating and dysuria.   Musculoskeletal:  Negative for joint swelling and myalgias.   Skin:  Negative for color change.   Neurological:  Positive for dizziness and headaches. Negative for seizures.   Psychiatric/Behavioral: Negative.     All other systems reviewed and are negative.       Physical Exam:  /68 (BP Location: Right arm, Patient Position: Sitting)   Pulse 80   Temp 97.8 °F (36.6 °C) (Oral)   Resp 16   Ht 160 cm (63\")   Wt 87.2 kg (192 lb 3.9 oz)   SpO2 97%   BMI 34.05 kg/m²         Physical Exam  Vitals and nursing note reviewed.   Constitutional:       General: She is not in acute distress.     Appearance: Normal appearance. She is not toxic-appearing.   HENT:      Head: Normocephalic and atraumatic.      Jaw: There is normal jaw occlusion.   Eyes:      General: Lids are normal.      Pupils: Pupils are equal, round, and reactive to light.      Comments: Right eye redness consistent with subconjunctival hemorrhage.   Cardiovascular:      Rate and Rhythm: Normal rate and regular rhythm.      Pulses: Normal pulses.      Heart sounds: Normal heart sounds.   Pulmonary:      Effort: Pulmonary effort is normal. No respiratory distress.      Breath sounds: Normal breath sounds. No wheezing or rhonchi.   Abdominal:      General: Abdomen is flat.      Palpations: Abdomen is soft.      Tenderness: There is no abdominal tenderness. There is no guarding or rebound.   Musculoskeletal:         General: Normal range of motion.      Cervical back: Normal range of motion and neck supple.      Right lower leg: No edema.      Left lower leg: No edema.   Skin:     General: Skin is warm and dry.   Neurological:      Mental Status: She is alert and oriented to person, place, and time. Mental status is at baseline.   Psychiatric:         Mood and Affect: Mood normal.                "       Procedures:  Procedures      Medical Decision Making:      Comorbidities that affect care:    None    External Notes reviewed:          The following orders were placed and all results were independently analyzed by me:  Orders Placed This Encounter   Procedures    CT Head Without Contrast    [facility] Draw    Comprehensive Metabolic Panel    Magnesium    Urinalysis With Microscopic If Indicated (No Culture) - Urine, Clean Catch    CBC Auto Differential    Single High Sensitivity Troponin T    NPO Diet NPO Type: Strict NPO    Undress & Gown    Continuous Pulse Oximetry    Vital Signs    Orthostatic Blood Pressure    Oxygen Therapy- Nasal Cannula; Titrate 1-6 LPM Per SpO2; 90 - 95%    POC Glucose Once    ECG 12 Lead Other; dizziness    Insert Peripheral IV    Fall Precautions    CBC & Differential    Green Top (Gel)    Lavender Top    Gold Top - SST    Light Blue Top       Medications Given in the Emergency Department:  Medications   sodium chloride 0.9 % flush 10 mL (has no administration in time range)   sodium chloride 0.9 % bolus 1,000 mL (1,000 mL Intravenous New Bag 4/1/24 1137)        ED Course:    The patient was initially evaluated in the triage area where orders were placed. The patient was later dispositioned by Tanner Pablo PA-C.      The patient was advised to stay for completion of workup which includes but is not limited to communication of labs and radiological results, reassessment and plan. The patient was advised that leaving prior to disposition by a provider could result in critical findings that are not communicated to the patient.     ED Course as of 04/01/24 1253   Mon Apr 01, 2024   1049 PROVIDER IN TRIAGE  Patient was evaluated by Steven luque PA-C. Orders were placed and awaiting final results and disposition.   [MV]      ED Course User Index  [MV] Steven Carias PA       Labs:    Lab Results (last 24 hours)       Procedure Component Value Units Date/Time    CBC & Differential  [[id number]]  (Abnormal) Collected: 04/01/24 1051    Specimen: Blood Updated: 04/01/24 1059    Narrative:      The following orders were created for panel order CBC & Differential.  Procedure                               Abnormality         Status                     ---------                               -----------         ------                     CBC Auto Differential[[id number]]        Abnormal            Final result                 Please view results for these tests on the individual orders.    Comprehensive Metabolic Panel [[id number]]  (Abnormal) Collected: 04/01/24 1051    Specimen: Blood Updated: 04/01/24 1116     Glucose 94 mg/dL      BUN 15 mg/dL      Creatinine 0.98 mg/dL      Sodium 136 mmol/L      Potassium 4.6 mmol/L      Chloride 99 mmol/L      CO2 29.1 mmol/L      Calcium 9.6 mg/dL      Total Protein 6.6 g/dL      Albumin 4.1 g/dL      ALT (SGPT) 11 U/L      AST (SGOT) 20 U/L      Alkaline Phosphatase 63 U/L      Total Bilirubin 0.3 mg/dL      Globulin 2.5 gm/dL      A/G Ratio 1.6 g/dL      BUN/Creatinine Ratio 15.3     Anion Gap 7.9 mmol/L      eGFR 67.9 mL/min/1.73     Narrative:      GFR Normal >60  Chronic Kidney Disease <60  Kidney Failure <15      Magnesium [[id number]]  (Normal) Collected: 04/01/24 1051    Specimen: Blood Updated: 04/01/24 1116     Magnesium 1.9 mg/dL     CBC Auto Differential [[id number]]  (Abnormal) Collected: 04/01/24 1051    Specimen: Blood Updated: 04/01/24 1059     WBC 4.51 10*3/mm3      RBC 4.68 10*6/mm3      Hemoglobin 13.6 g/dL      Hematocrit 41.7 %      MCV 89.1 fL      MCH 29.1 pg      MCHC 32.6 g/dL      RDW 12.8 %      RDW-SD 41.4 fl      MPV 11.2 fL      Platelets 183 10*3/mm3      Neutrophil % 34.1 %      Lymphocyte % 49.7 %      Monocyte % 8.0 %      Eosinophil % 7.1 %      Basophil % 0.9 %      Immature Grans % 0.2 %      Neutrophils, Absolute 1.54 10*3/mm3      Lymphocytes, Absolute 2.24 10*3/mm3      Monocytes, Absolute 0.36 10*3/mm3      Eosinophils,  Absolute 0.32 10*3/mm3      Basophils, Absolute 0.04 10*3/mm3      Immature Grans, Absolute 0.01 10*3/mm3      nRBC 0.0 /100 WBC     Single High Sensitivity Troponin T [[id number]]  (Normal) Collected: 04/01/24 1051    Specimen: Blood Updated: 04/01/24 1143     HS Troponin T <6 ng/L     Narrative:      High Sensitive Troponin T Reference Range:  <14.0 ng/L- Negative Female for AMI  <22.0 ng/L- Negative Male for AMI  >=14 - Abnormal Female indicating possible myocardial injury.  >=22 - Abnormal Male indicating possible myocardial injury.   Clinicians would have to utilize clinical acumen, EKG, Troponin, and serial changes to determine if it is an Acute Myocardial Infarction or myocardial injury due to an underlying chronic condition.         Urinalysis With Microscopic If Indicated (No Culture) - Urine, Clean Catch [[id number]]  (Normal) Collected: 04/01/24 1114    Specimen: Urine, Clean Catch Updated: 04/01/24 1125     Color, UA Yellow     Appearance, UA Clear     pH, UA 6.5     Specific Gravity, UA <=1.005     Glucose, UA Negative     Ketones, UA Negative     Bilirubin, UA Negative     Blood, UA Negative     Protein, UA Negative     Leuk Esterase, UA Negative     Nitrite, UA Negative     Urobilinogen, UA 0.2 E.U./dL    Narrative:      Urine microscopic not indicated.             Imaging:    CT Head Without Contrast    Result Date: 4/1/2024  CT HEAD WO CONTRAST-  Date of Exam: 4/1/2024 11:51 AM  Indication: severe ha.  Comparison: August 7, 2021  Technique: CT scan of the head without IV contrast.  Automated exposure control and iterative reconstruction methods were used.  FINDINGS No acute intracranial hemorrhage or extra-axial collection is identified. The ventricles appear normal in caliber, with no evidence of mass effect or midline shift. The basal cisterns appear patent. The gray-white differentiation appears preserved.  The calvarium appears intact. The visualized paranasal sinuses are clear. The mastoid air  cells are well-aerated.      No acute intracranial process identified.  Electronically Signed By-Sky Lester MD On:4/1/2024 12:05 PM         Differential Diagnosis and Discussion:      Dizziness: Based on the patient's history, signs, and symptoms, the diffential diagnosis includes but is not limited to meningitis, stroke, sepsis, subarachnoid hemorrhage, intracranial bleeding, encephalitis, vertigo, electrolyte imbalance, and metabolic disorders.  Headache: Differential diagnosis includes but is not limited to migraine, cluster headache, hypertension, tumor, subarachnoid bleeding, pseudotumor cerebri, temporal arteritis, infections, tension headache, and TMJ syndrome.    All labs were reviewed and interpreted by me.  EKG was interpreted by supervising attending.  CT scan radiology impression was interpreted by me.    MDM     Amount and/or Complexity of Data Reviewed  Clinical lab tests: reviewed       The patient´s CBC that was reviewed and interpreted by me shows no abnormalities of critical concern. Of note, there is no anemia requiring a blood transfusion and the platelet count is acceptable.  The patient´s CMP that was reviewed and interpretted by me shows no abnormalities of critical concern. Of note, the patient´s sodium and potassium are acceptable. The patient´s liver enzymes are unremarkable. The patient´s renal function (creatinine) is preserved. The patient has a normal anion gap.  Troponin within normal limits.  CT head without contrast shows no acute intracranial process.  EKG shows sinus rhythm.  Patient denies chest pain and shortness of air.  Patient states she feels much better after receiving a liter of fluids.  Patient had an medication for headache and states that it has gotten much better.  Instructed patient to return to ED if she develops any new or worsening symptoms.  Patient states she understands and agrees with plan of care.          Patient Care  Considerations:          Consultants/Shared Management Plan:    None    Social Determinants of Health:    Patient is independent, reliable, and has access to care.       Disposition and Care Coordination:    Discharged: The patient is suitable and stable for discharge with no need for consideration of admission.    I have explained the patient´s condition, diagnoses and treatment plan based on the information available to me at this time. I have answered questions and addressed any concerns. The patient has a good  understanding of the patient´s diagnosis, condition, and treatment plan as can be expected at this point. The vital signs have been stable. The patient´s condition is stable and appropriate for discharge from the emergency department.      The patient will pursue further outpatient evaluation with the primary care physician or other designated or consulting physician as outlined in the discharge instructions. They are agreeable to this plan of care and follow-up instructions have been explained in detail. The patient has received these instructions in written format and has expressed an understanding of the discharge instructions. The patient is aware that any significant change in condition or worsening of symptoms should prompt an immediate return to this or the closest emergency department or call to 911.  I have explained discharge medications and the need for follow up with the patient/caretakers. This was also printed in the discharge instructions. Patient was discharged with the following medications and follow up:      Medication List      No changes were made to your prescriptions during this visit.      Haydee Jolly, APRN  [address] [address]  [phone]    Schedule an appointment as soon as possible for a visit in 1 week  Follow-up       Final diagnoses:   Subconjunctival hemorrhage of right eye   Acute nonintractable headache, unspecified headache type         ED Disposition       ED Disposition   Discharge    Condition   Stable    Comment   --               This medical record created using voice recognition software.             Tanner Pablo PA-C  04/01/24 3170